# Patient Record
Sex: FEMALE | Race: WHITE
[De-identification: names, ages, dates, MRNs, and addresses within clinical notes are randomized per-mention and may not be internally consistent; named-entity substitution may affect disease eponyms.]

---

## 2017-03-21 ENCOUNTER — HOSPITAL ENCOUNTER (OUTPATIENT)
Dept: HOSPITAL 61 - PCVCIMAG | Age: 82
Discharge: HOME | End: 2017-03-21
Attending: INTERNAL MEDICINE
Payer: MEDICARE

## 2017-03-21 DIAGNOSIS — I10: ICD-10-CM

## 2017-03-21 DIAGNOSIS — I25.10: ICD-10-CM

## 2017-03-21 DIAGNOSIS — I65.23: Primary | ICD-10-CM

## 2017-03-21 DIAGNOSIS — I73.9: ICD-10-CM

## 2017-03-21 PROCEDURE — 93005 ELECTROCARDIOGRAM TRACING: CPT

## 2017-03-21 PROCEDURE — 93306 TTE W/DOPPLER COMPLETE: CPT

## 2017-03-21 PROCEDURE — 93880 EXTRACRANIAL BILAT STUDY: CPT

## 2017-03-21 PROCEDURE — G0463 HOSPITAL OUTPT CLINIC VISIT: HCPCS

## 2017-05-15 ENCOUNTER — HOSPITAL ENCOUNTER (OUTPATIENT)
Dept: HOSPITAL 61 - PCVCIMAG | Age: 82
Discharge: HOME | End: 2017-05-15
Attending: RADIOLOGY
Payer: MEDICARE

## 2017-05-15 DIAGNOSIS — J44.9: ICD-10-CM

## 2017-05-15 DIAGNOSIS — I10: ICD-10-CM

## 2017-05-15 DIAGNOSIS — Z95.820: ICD-10-CM

## 2017-05-15 DIAGNOSIS — I25.10: ICD-10-CM

## 2017-05-15 DIAGNOSIS — I70.213: Primary | ICD-10-CM

## 2017-05-15 DIAGNOSIS — E78.00: ICD-10-CM

## 2017-05-15 PROCEDURE — G0463 HOSPITAL OUTPT CLINIC VISIT: HCPCS

## 2017-05-15 PROCEDURE — 93923 UPR/LXTR ART STDY 3+ LVLS: CPT

## 2017-05-15 PROCEDURE — 93925 LOWER EXTREMITY STUDY: CPT

## 2017-07-28 ENCOUNTER — HOSPITAL ENCOUNTER (OUTPATIENT)
Dept: HOSPITAL 35 - PAIN | Age: 82
Discharge: HOME | End: 2017-07-28
Attending: ANESTHESIOLOGY
Payer: COMMERCIAL

## 2017-07-28 VITALS — DIASTOLIC BLOOD PRESSURE: 70 MMHG | SYSTOLIC BLOOD PRESSURE: 185 MMHG

## 2017-07-28 VITALS — HEIGHT: 62.01 IN | BODY MASS INDEX: 24.82 KG/M2 | WEIGHT: 136.6 LBS

## 2017-07-28 DIAGNOSIS — M54.16: Primary | ICD-10-CM

## 2017-07-28 DIAGNOSIS — M53.3: ICD-10-CM

## 2017-07-28 DIAGNOSIS — G62.9: ICD-10-CM

## 2017-07-28 DIAGNOSIS — M81.0: ICD-10-CM

## 2017-07-28 DIAGNOSIS — Z87.891: ICD-10-CM

## 2017-07-28 DIAGNOSIS — G89.4: ICD-10-CM

## 2017-09-22 ENCOUNTER — HOSPITAL ENCOUNTER (OUTPATIENT)
Dept: HOSPITAL 61 - PCVCCLINIC | Age: 82
Discharge: HOME | End: 2017-09-22
Attending: INTERNAL MEDICINE
Payer: MEDICARE

## 2017-09-22 DIAGNOSIS — Z79.899: ICD-10-CM

## 2017-09-22 DIAGNOSIS — I73.9: ICD-10-CM

## 2017-09-22 DIAGNOSIS — I10: ICD-10-CM

## 2017-09-22 DIAGNOSIS — Z87.891: ICD-10-CM

## 2017-09-22 DIAGNOSIS — Z79.82: ICD-10-CM

## 2017-09-22 DIAGNOSIS — I25.10: Primary | ICD-10-CM

## 2017-09-22 DIAGNOSIS — E78.00: ICD-10-CM

## 2017-09-22 PROCEDURE — 93005 ELECTROCARDIOGRAM TRACING: CPT

## 2017-09-22 PROCEDURE — G0463 HOSPITAL OUTPT CLINIC VISIT: HCPCS

## 2017-11-27 ENCOUNTER — HOSPITAL ENCOUNTER (OUTPATIENT)
Dept: HOSPITAL 61 - PCVCIMAG | Age: 82
Discharge: HOME | End: 2017-11-27
Attending: RADIOLOGY
Payer: MEDICARE

## 2017-11-27 DIAGNOSIS — M79.605: ICD-10-CM

## 2017-11-27 DIAGNOSIS — Z79.82: ICD-10-CM

## 2017-11-27 DIAGNOSIS — Z79.899: ICD-10-CM

## 2017-11-27 DIAGNOSIS — I73.9: Primary | ICD-10-CM

## 2017-11-27 DIAGNOSIS — I10: ICD-10-CM

## 2017-11-27 DIAGNOSIS — I25.10: ICD-10-CM

## 2017-11-27 DIAGNOSIS — E78.00: ICD-10-CM

## 2017-11-27 DIAGNOSIS — I77.9: ICD-10-CM

## 2017-11-27 DIAGNOSIS — Z87.891: ICD-10-CM

## 2017-11-27 DIAGNOSIS — J44.9: ICD-10-CM

## 2017-11-27 DIAGNOSIS — M79.604: ICD-10-CM

## 2017-11-27 PROCEDURE — G0463 HOSPITAL OUTPT CLINIC VISIT: HCPCS

## 2017-11-27 PROCEDURE — 93923 UPR/LXTR ART STDY 3+ LVLS: CPT

## 2017-11-27 PROCEDURE — 93925 LOWER EXTREMITY STUDY: CPT

## 2017-11-27 NOTE — PCVCIMAG
EXAM: NONINVASIVE ARTERIAL EXAMINATION OF BOTH LOWER EXTREMITIES

INCLUDING PRE AND POST EXERCISE PRESSURE MEASUREMENTS AND DOPPLER

WAVEFORMS



INDICATION: Peripheral Arterial Disease. Leg pain.



FINDINGS: 

Right Brachial: 210 mm Hg.

Right Dorsalis Pedis: 209 mm Hg.

Right Posterior Tibial: 164 mm Hg.

Right MOSHE = 1.00.



Left Brachial: 209 mm Hg.

Left Dorsalis Pedis: 168 mm Hg.

Left Posterior Tibial: 173 mm Hg.

Left MOSHE = 0.82.



Post Exercise:

Right Brachial  210 mm Hg.

Right Dorsalis Pedis:  161 mm Hg.

Left Posterior Tibial:  187 mm Hg.

Right MOSHE =  0.77.

Left MOSHE =  0.89.



IMPRESSION: 

No resting ischemia in the right lower extremity.

Mild exercise induced ischemia in the right lower extremity.

Mild resting ischemia in the left lower extremity.

Mild exercise induced ischemia in the left lower extremity.



LOC:[id number]

## 2017-11-27 NOTE — PCVCIMAG
EXAM: BILATERAL LOWER EXTREMITY ARTERIAL DUPLEX



INDICATION: Peripheral Arterial Disease. Leg pain.



FINDINGS: 

Right Leg: Satisfactory waveforms in the common femoral and profunda

femoral arteries. Previous stent upper superficial femoral artery is

patent. No flow-limiting stenosis in the superficial femoral artery.

Mild 40% stenosis mid popliteal artery is not felt to be

flow-limiting. The peroneal artery is occluded. The anterior tibial

and posterior tibial arteries are patent.    



Left Leg:  Satisfactory waveforms in the common femoral and profunda

femoral arteries. Elevated systolic velocity of 380 cm/s the distal

superficial femoral artery within prior stent consistent with 80%

restenosis. The popliteal artery is patent. The anterior tibial,

peroneal, and posterior tibial arteries are patent.    



IMPRESSION: 

Previous upper right superficial femoral artery stent maintaining

satisfactory patency.

40% mid right popliteal artery stenosis is not felt to be

flow-limiting.

Occlusion of the right peroneal artery.

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

stent.

   



LOC:[id number]

## 2017-12-22 ENCOUNTER — HOSPITAL ENCOUNTER (OUTPATIENT)
Dept: HOSPITAL 35 - PAIN | Age: 82
Discharge: HOME | End: 2017-12-22
Attending: ANESTHESIOLOGY
Payer: COMMERCIAL

## 2017-12-22 VITALS — HEIGHT: 62.01 IN | BODY MASS INDEX: 24.53 KG/M2 | WEIGHT: 135 LBS

## 2017-12-22 VITALS — SYSTOLIC BLOOD PRESSURE: 188 MMHG | DIASTOLIC BLOOD PRESSURE: 91 MMHG

## 2017-12-22 DIAGNOSIS — Z79.891: ICD-10-CM

## 2017-12-22 DIAGNOSIS — M47.816: Primary | ICD-10-CM

## 2017-12-22 DIAGNOSIS — Z79.82: ICD-10-CM

## 2017-12-22 DIAGNOSIS — Z98.890: ICD-10-CM

## 2017-12-22 DIAGNOSIS — Z87.891: ICD-10-CM

## 2017-12-22 DIAGNOSIS — M53.3: ICD-10-CM

## 2017-12-22 DIAGNOSIS — G89.29: ICD-10-CM

## 2017-12-22 DIAGNOSIS — Z79.899: ICD-10-CM

## 2018-03-09 ENCOUNTER — HOSPITAL ENCOUNTER (OUTPATIENT)
Dept: HOSPITAL 61 - PCVCIMAG | Age: 83
Discharge: HOME | End: 2018-03-09
Attending: RADIOLOGY
Payer: MEDICARE

## 2018-03-09 DIAGNOSIS — Z87.891: ICD-10-CM

## 2018-03-09 DIAGNOSIS — I48.92: ICD-10-CM

## 2018-03-09 DIAGNOSIS — I10: ICD-10-CM

## 2018-03-09 DIAGNOSIS — E78.00: ICD-10-CM

## 2018-03-09 DIAGNOSIS — I73.9: Primary | ICD-10-CM

## 2018-03-09 DIAGNOSIS — Z79.82: ICD-10-CM

## 2018-03-09 DIAGNOSIS — I42.9: ICD-10-CM

## 2018-03-09 DIAGNOSIS — Z79.899: ICD-10-CM

## 2018-03-09 DIAGNOSIS — I70.8: ICD-10-CM

## 2018-03-09 PROCEDURE — 93925 LOWER EXTREMITY STUDY: CPT

## 2018-03-09 PROCEDURE — 93923 UPR/LXTR ART STDY 3+ LVLS: CPT

## 2018-03-09 PROCEDURE — 93005 ELECTROCARDIOGRAM TRACING: CPT

## 2018-03-26 ENCOUNTER — HOSPITAL ENCOUNTER (OUTPATIENT)
Dept: HOSPITAL 35 - PAIN | Age: 83
End: 2018-03-26
Attending: ANESTHESIOLOGY
Payer: COMMERCIAL

## 2018-03-26 VITALS — SYSTOLIC BLOOD PRESSURE: 125 MMHG | DIASTOLIC BLOOD PRESSURE: 89 MMHG

## 2018-03-26 VITALS — HEIGHT: 62.01 IN | BODY MASS INDEX: 24.71 KG/M2 | WEIGHT: 136 LBS

## 2018-03-26 DIAGNOSIS — M47.897: ICD-10-CM

## 2018-03-26 DIAGNOSIS — M54.16: Primary | ICD-10-CM

## 2018-03-26 DIAGNOSIS — M53.3: ICD-10-CM

## 2018-03-26 DIAGNOSIS — Z79.899: ICD-10-CM

## 2018-04-03 ENCOUNTER — HOSPITAL ENCOUNTER (OUTPATIENT)
Dept: HOSPITAL 61 - PCVCINTER | Age: 83
Discharge: HOME | End: 2018-04-03
Attending: RADIOLOGY
Payer: MEDICARE

## 2018-04-03 DIAGNOSIS — Z87.891: ICD-10-CM

## 2018-04-03 DIAGNOSIS — I70.1: ICD-10-CM

## 2018-04-03 DIAGNOSIS — I70.212: ICD-10-CM

## 2018-04-03 DIAGNOSIS — I70.291: ICD-10-CM

## 2018-04-03 DIAGNOSIS — I70.0: ICD-10-CM

## 2018-04-03 DIAGNOSIS — I10: ICD-10-CM

## 2018-04-03 DIAGNOSIS — I25.10: Primary | ICD-10-CM

## 2018-04-03 PROCEDURE — 99153 MOD SED SAME PHYS/QHP EA: CPT

## 2018-04-03 PROCEDURE — 99152 MOD SED SAME PHYS/QHP 5/>YRS: CPT

## 2018-04-03 PROCEDURE — 75716 ARTERY X-RAYS ARMS/LEGS: CPT

## 2018-04-03 PROCEDURE — 76937 US GUIDE VASCULAR ACCESS: CPT

## 2018-04-03 PROCEDURE — 37229: CPT

## 2018-04-03 PROCEDURE — 93458 L HRT ARTERY/VENTRICLE ANGIO: CPT

## 2018-04-03 PROCEDURE — 37225: CPT

## 2018-04-03 PROCEDURE — 36252 INS CATH REN ART 1ST BILAT: CPT

## 2018-04-03 PROCEDURE — 37186 SEC ART THROMBECTOMY ADD-ON: CPT

## 2018-04-04 ENCOUNTER — HOSPITAL ENCOUNTER (OUTPATIENT)
Dept: HOSPITAL 35 - CATH | Age: 83
LOS: 1 days | Discharge: HOME | End: 2018-04-05
Attending: INTERNAL MEDICINE
Payer: COMMERCIAL

## 2018-04-04 VITALS — SYSTOLIC BLOOD PRESSURE: 146 MMHG | DIASTOLIC BLOOD PRESSURE: 60 MMHG

## 2018-04-04 VITALS — HEIGHT: 60 IN | BODY MASS INDEX: 27.11 KG/M2 | WEIGHT: 138.1 LBS

## 2018-04-04 VITALS — SYSTOLIC BLOOD PRESSURE: 113 MMHG | DIASTOLIC BLOOD PRESSURE: 50 MMHG

## 2018-04-04 DIAGNOSIS — I73.89: ICD-10-CM

## 2018-04-04 DIAGNOSIS — Z79.01: ICD-10-CM

## 2018-04-04 DIAGNOSIS — J44.9: ICD-10-CM

## 2018-04-04 DIAGNOSIS — I10: ICD-10-CM

## 2018-04-04 DIAGNOSIS — E78.00: ICD-10-CM

## 2018-04-04 DIAGNOSIS — Z79.899: ICD-10-CM

## 2018-04-04 DIAGNOSIS — Z79.82: ICD-10-CM

## 2018-04-04 DIAGNOSIS — I25.110: Primary | ICD-10-CM

## 2018-04-04 DIAGNOSIS — G89.4: ICD-10-CM

## 2018-04-04 DIAGNOSIS — Z98.890: ICD-10-CM

## 2018-04-04 DIAGNOSIS — Z87.19: ICD-10-CM

## 2018-04-04 PROCEDURE — 10797: CPT

## 2018-04-05 VITALS — SYSTOLIC BLOOD PRESSURE: 160 MMHG | DIASTOLIC BLOOD PRESSURE: 83 MMHG

## 2018-04-05 VITALS — DIASTOLIC BLOOD PRESSURE: 53 MMHG | SYSTOLIC BLOOD PRESSURE: 121 MMHG

## 2018-04-05 VITALS — DIASTOLIC BLOOD PRESSURE: 72 MMHG | SYSTOLIC BLOOD PRESSURE: 156 MMHG

## 2018-04-05 VITALS — DIASTOLIC BLOOD PRESSURE: 83 MMHG | SYSTOLIC BLOOD PRESSURE: 160 MMHG

## 2018-04-05 LAB
ALBUMIN SERPL-MCNC: 3.4 G/DL (ref 3.4–5)
ALT SERPL-CCNC: 18 U/L (ref 30–65)
ANION GAP SERPL CALC-SCNC: 7 MMOL/L (ref 7–16)
AST SERPL-CCNC: 24 U/L (ref 15–37)
BILIRUB SERPL-MCNC: 0.4 MG/DL
BUN SERPL-MCNC: 14 MG/DL (ref 7–18)
CALCIUM SERPL-MCNC: 9.3 MG/DL (ref 8.5–10.1)
CHLORIDE SERPL-SCNC: 104 MMOL/L (ref 98–107)
CO2 SERPL-SCNC: 27 MMOL/L (ref 21–32)
CREAT SERPL-MCNC: 0.8 MG/DL (ref 0.6–1)
ERYTHROCYTE [DISTWIDTH] IN BLOOD BY AUTOMATED COUNT: 15 % (ref 10.5–14.5)
GLUCOSE SERPL-MCNC: 106 MG/DL (ref 74–106)
HCT VFR BLD CALC: 37.9 % (ref 37–47)
HGB BLD-MCNC: 12.5 GM/DL (ref 12–15)
MCH RBC QN AUTO: 29.5 PG (ref 26–34)
MCHC RBC AUTO-ENTMCNC: 33 G/DL (ref 28–37)
MCV RBC: 89.6 FL (ref 80–100)
PLATELET # BLD: 239 THOU/UL (ref 150–400)
POTASSIUM SERPL-SCNC: 3.9 MMOL/L (ref 3.5–5.1)
PROT SERPL-MCNC: 6.9 G/DL (ref 6.4–8.2)
RBC # BLD AUTO: 4.23 MIL/UL (ref 4.2–5)
SODIUM SERPL-SCNC: 138 MMOL/L (ref 136–145)
TROPONIN I SERPL-MCNC: 0.52 NG/ML (ref ?–0.06)
WBC # BLD AUTO: 6.7 THOU/UL (ref 4–11)

## 2018-04-20 ENCOUNTER — HOSPITAL ENCOUNTER (OUTPATIENT)
Dept: HOSPITAL 61 - PCVCCLINIC | Age: 83
Discharge: HOME | End: 2018-04-20
Attending: INTERNAL MEDICINE
Payer: MEDICARE

## 2018-04-20 DIAGNOSIS — Z87.891: ICD-10-CM

## 2018-04-20 DIAGNOSIS — I25.10: ICD-10-CM

## 2018-04-20 DIAGNOSIS — I10: Primary | ICD-10-CM

## 2018-04-20 DIAGNOSIS — I73.9: ICD-10-CM

## 2018-04-20 DIAGNOSIS — Z79.82: ICD-10-CM

## 2018-04-20 DIAGNOSIS — E78.00: ICD-10-CM

## 2018-04-20 DIAGNOSIS — Z79.899: ICD-10-CM

## 2018-04-20 DIAGNOSIS — R94.31: ICD-10-CM

## 2018-04-20 PROCEDURE — 36415 COLL VENOUS BLD VENIPUNCTURE: CPT

## 2018-04-20 PROCEDURE — 93005 ELECTROCARDIOGRAM TRACING: CPT

## 2018-06-21 ENCOUNTER — HOSPITAL ENCOUNTER (OUTPATIENT)
Dept: HOSPITAL 61 - PCVCCLINIC | Age: 83
Discharge: HOME | End: 2018-06-21
Attending: INTERNAL MEDICINE
Payer: MEDICARE

## 2018-06-21 ENCOUNTER — HOSPITAL ENCOUNTER (OUTPATIENT)
Dept: HOSPITAL 35 - PAIN | Age: 83
End: 2018-06-21
Attending: ANESTHESIOLOGY
Payer: COMMERCIAL

## 2018-06-21 VITALS — DIASTOLIC BLOOD PRESSURE: 56 MMHG | SYSTOLIC BLOOD PRESSURE: 154 MMHG

## 2018-06-21 VITALS — HEIGHT: 62.01 IN | WEIGHT: 130.2 LBS | BODY MASS INDEX: 23.66 KG/M2

## 2018-06-21 DIAGNOSIS — E78.00: ICD-10-CM

## 2018-06-21 DIAGNOSIS — M47.816: Primary | ICD-10-CM

## 2018-06-21 DIAGNOSIS — M47.817: ICD-10-CM

## 2018-06-21 DIAGNOSIS — I25.10: Primary | ICD-10-CM

## 2018-06-21 DIAGNOSIS — I10: ICD-10-CM

## 2018-06-21 DIAGNOSIS — M41.85: ICD-10-CM

## 2018-06-21 DIAGNOSIS — I73.9: ICD-10-CM

## 2018-06-21 DIAGNOSIS — Z79.899: ICD-10-CM

## 2018-06-21 PROCEDURE — 36415 COLL VENOUS BLD VENIPUNCTURE: CPT

## 2018-08-21 ENCOUNTER — HOSPITAL ENCOUNTER (OUTPATIENT)
Dept: HOSPITAL 61 - PCVCIMAG | Age: 83
Discharge: HOME | End: 2018-08-21
Attending: RADIOLOGY
Payer: MEDICARE

## 2018-08-21 DIAGNOSIS — I25.10: ICD-10-CM

## 2018-08-21 DIAGNOSIS — E78.00: ICD-10-CM

## 2018-08-21 DIAGNOSIS — Z87.891: ICD-10-CM

## 2018-08-21 DIAGNOSIS — Z79.82: ICD-10-CM

## 2018-08-21 DIAGNOSIS — I73.9: Primary | ICD-10-CM

## 2018-08-21 DIAGNOSIS — I10: ICD-10-CM

## 2018-08-21 DIAGNOSIS — I77.9: ICD-10-CM

## 2018-08-21 PROCEDURE — 93925 LOWER EXTREMITY STUDY: CPT

## 2018-08-21 PROCEDURE — G0463 HOSPITAL OUTPT CLINIC VISIT: HCPCS

## 2018-08-21 NOTE — PCVCIMAG
EXAM: BILATERAL LOWER EXTREMITY ARTERIAL DUPLEX



INDICATION: Peripheral Arterial Disease. Leg pain.



FINDINGS: 

Right Leg: Common femoral and profunda femoral arteries are patent. 

Superficial femoral artery and popliteal artery maintaining adequate

patency.  Previous stent upper superficial femoral artery is patent. 

Unchanged occlusion of the right peroneal and right posterior tibial

arteries.  Anterior tibial artery is patent.    



Left Leg:  Common femoral and profunda femoral arteries are patent. 

Superficial femoral and popliteal arteries are patent.  Previous stent

throughout the superficial femoral artery is patent.  Anterior tibial

artery shows satisfactory patency as is the posterior tibial artery. 

Mid and distal peroneal artery is occluded.    



IMPRESSION: 

Previous upper right superficial femoral artery stent is patent.

Unchanged occlusion right peroneal and right posterior tibial

arteries.

Previous left superficial femoral artery stent is patent.

Unchanged occlusion of the mid/distal left peroneal artery.



LOC:[id number]

## 2018-11-27 ENCOUNTER — HOSPITAL ENCOUNTER (OUTPATIENT)
Dept: HOSPITAL 35 - PAIN | Age: 83
End: 2018-11-27
Attending: CLINICAL NURSE SPECIALIST
Payer: COMMERCIAL

## 2018-11-27 VITALS — HEIGHT: 62.01 IN | BODY MASS INDEX: 24.17 KG/M2 | WEIGHT: 133 LBS

## 2018-11-27 VITALS — SYSTOLIC BLOOD PRESSURE: 154 MMHG | DIASTOLIC BLOOD PRESSURE: 69 MMHG

## 2018-11-27 DIAGNOSIS — M48.061: ICD-10-CM

## 2018-11-27 DIAGNOSIS — M47.27: Primary | ICD-10-CM

## 2018-11-27 DIAGNOSIS — M51.16: ICD-10-CM

## 2018-11-27 DIAGNOSIS — Z79.899: ICD-10-CM

## 2018-11-27 DIAGNOSIS — G89.4: ICD-10-CM

## 2018-12-18 ENCOUNTER — HOSPITAL ENCOUNTER (OUTPATIENT)
Dept: HOSPITAL 61 - PCVCIMAG | Age: 83
Discharge: HOME | End: 2018-12-18
Attending: INTERNAL MEDICINE
Payer: MEDICARE

## 2018-12-18 DIAGNOSIS — J44.9: ICD-10-CM

## 2018-12-18 DIAGNOSIS — I08.1: Primary | ICD-10-CM

## 2018-12-18 DIAGNOSIS — R60.0: ICD-10-CM

## 2018-12-18 DIAGNOSIS — Z79.82: ICD-10-CM

## 2018-12-18 DIAGNOSIS — I25.10: ICD-10-CM

## 2018-12-18 DIAGNOSIS — K21.9: ICD-10-CM

## 2018-12-18 DIAGNOSIS — Z87.891: ICD-10-CM

## 2018-12-18 DIAGNOSIS — E78.00: ICD-10-CM

## 2018-12-18 DIAGNOSIS — I73.9: ICD-10-CM

## 2018-12-18 DIAGNOSIS — I10: ICD-10-CM

## 2018-12-18 DIAGNOSIS — E78.5: ICD-10-CM

## 2018-12-18 PROCEDURE — 93306 TTE W/DOPPLER COMPLETE: CPT

## 2018-12-18 PROCEDURE — G0463 HOSPITAL OUTPT CLINIC VISIT: HCPCS

## 2018-12-18 PROCEDURE — 93005 ELECTROCARDIOGRAM TRACING: CPT

## 2018-12-18 NOTE — PCVCIMAG
--------------- APPROVED REPORT --------------





Study performed:  12/18/2018 13:13:33



EXAM: Comprehensive 2D, Doppler, and color-flow 

Echocardiogram

Patient Location: Echo lab

Status:  routine



BSA:         1.59

HR: 80 bpmBP:          180/80 mmHg

Rhythm: NSR



Other Information 

Study Quality: Good



Risk Factors: 

Cardiac Risk Factors:  

HTN



Indications

CAD

Hypertension/HDD

Hyperlipidemia, COPD, PAD



2D Dimensions

IVSd:  10.28 (7-11mm)LVOT Diam:  21.17 (18-24mm) 

LVDd:  42.17 mm

PWd:  10.15 (7-11mm)Ascending Ao:  30.21 (22-36mm)

LVDs:  22.80 (25-40mm)

Left Atrium:  43.22 (27-40mm)

Aortic Root:  31.71 mm

LV Single Plane 4CH:  53.35 %

LV Single Plane 2CH:  43.92 %

Biplane EF:  45.2 %



Volumes

Left Atrial Volume (Systole)

Single Plane 4CH:  41.94 mLSingle Plane 2CH:  39.00 mL

LA ESV Index:  26.00 mL/m2



Aortic Valve

AoV Peak Erik.:  1.20 m/s

AO Peak Gr.:  5.78 mmHgLVOT Max PG:  3.19 mmHg

LVOT Max V:  0.89 m/s

WILLIAM Vmax: 2.61 cm2



Mitral Valve

E/A Ratio:  1.1

MV Decel. Time:  122.75 ms

MV E Max Erik.:  1.17 m/s

MV A Erik.:  1.05 m/s



TDI

E/Lateral E':  10.64E/Medial E':  14.63

Medial E' Erik.:  0.08 m/s

Lateral E' Erik.:  0.11 m/s



Pulmonary Valve

PV Peak Erik.:  11.00 m/sPV Peak Gr.:  2.60 mmHg



Pulmonary Vein

P Vein S:    0.43 m/sP Vein A:  0.36 m/s

P Vein D:   0.81 m/sP Vein A Dur.:  96.9 msec

P Vein S/D Ratio:  0.53



Tricuspid Valve

TR Peak Erik.:  4.15 m/s

TR Peak Gr.:  68.91 mmHg



Left Ventricle

The left ventricle is normal size. There is normal LV segmental wall 

motion. There is normal left ventricular wall thickness. Left 

ventricular systolic function is normal. The left ventricular 

ejection fraction is within the normal range. LVEF is 55%. The left 

ventricular diastolic function is normal.



Right Ventricle

The right ventricle is normal size. The right ventricular systolic 

function is normal.



Atria

The left atrium size is normal. The right atrium size is 

normal.



Aortic Valve

The aortic valve is normal in structure. No aortic regurgitation is 

present. There is no aortic valvular stenosis.



Mitral Valve

The mitral valve is normal in structure. Moderate mitral 

regurgitation. No evidence of mitral valve stenosis.



Tricuspid Valve

The tricuspid valve is normal in structure. Mild tricuspid 

regurgitation. Pulmonary artery pressure is 76mmHg. Moderately severe 

pulmonary hypertension.



Pulmonic Valve

The pulmonary valve is normal in structure. Mild pulmonic 

regurgitation.



Great Vessels

The aortic root is normal in size. IVC is normal in size and 

collapses &gt;50% with inspiration.



Pericardium

There is no pericardial effusion.



&lt;Conclusion&gt;

The left ventricle is normal size.

There is normal left ventricular wall thickness.

Left ventricular systolic function is normal.

The right ventricle is normal size.

The left atrium size is normal.

The aortic valve is normal in structure.

Moderate mitral regurgitation.

Mild tricuspid regurgitation. Pulmonary artery pressure is 

76mmHg.

Moderately severe pulmonary hypertension.

## 2019-02-19 ENCOUNTER — HOSPITAL ENCOUNTER (OUTPATIENT)
Dept: HOSPITAL 61 - PCVCIMAG | Age: 84
Discharge: HOME | End: 2019-02-19
Attending: RADIOLOGY
Payer: MEDICARE

## 2019-02-19 DIAGNOSIS — I65.23: Primary | ICD-10-CM

## 2019-02-19 DIAGNOSIS — I10: ICD-10-CM

## 2019-02-19 DIAGNOSIS — E78.00: ICD-10-CM

## 2019-02-19 DIAGNOSIS — Z79.899: ICD-10-CM

## 2019-02-19 DIAGNOSIS — I73.9: ICD-10-CM

## 2019-02-19 DIAGNOSIS — I77.9: ICD-10-CM

## 2019-02-19 DIAGNOSIS — Z87.891: ICD-10-CM

## 2019-02-19 DIAGNOSIS — J44.9: ICD-10-CM

## 2019-02-19 DIAGNOSIS — I25.10: ICD-10-CM

## 2019-02-19 PROCEDURE — G0463 HOSPITAL OUTPT CLINIC VISIT: HCPCS

## 2019-02-19 PROCEDURE — 93925 LOWER EXTREMITY STUDY: CPT

## 2019-02-19 PROCEDURE — 93880 EXTRACRANIAL BILAT STUDY: CPT

## 2019-02-19 NOTE — PCVCIMAG
EXAM: BILATERAL LOWER EXTREMITY ARTERIAL DUPLEX



INDICATION: Peripheral Arterial Disease. Leg pain.



FINDINGS: 

Right Leg: Common femoral profunda femoral arteries are patent.

Superficial femoral artery and popliteal artery are patent. Previous

stent proximal superficial femoral artery is patent. Occlusion

throughout the peroneal artery and posterior tibial artery are

unchanged. Anterior tibial artery maintaining adequate patency.    



Left Leg:  Common femoral and profunda femoral arteries are patent.

Superficial femoral artery and popliteal artery are patent including

previous superficial femoral artery and popliteal artery stents. The

anterior tibial, peroneal, and posterior tibial arteries are patent.  

 



IMPRESSION: 

Previous proximal right superficial femoral artery stent remains

patent.

Unchanged occlusion of the right peroneal and posterior tibial

arteries.

Previous left superficial femoral artery and upper popliteal artery

stents maintaining satisfactory patency.

   



LOC:[id number]

## 2019-02-19 NOTE — PCVCIMAG
--------------- APPROVED REPORT --------------





Laterality: Bilateral



Patient Location: Out-Patient



Indications

Stenosis



Doppler Spectral Velocity Analysis

PSV  /  EDVPSV  /  EDV

ECA (R)     102 / 3 cm/sECA (L)     286 / 18 cm/s



dICA (R)    62 / 17 cm/sdICA (L)     71 / 12 cm/s

Kathrine (R)     91 / 15 cm/smICA (L)     82 / 18 cm/s

pICA (R)     89 / 14 cm/spICA (L)     85 / 15 cm/s



Bulb (R)        41 / 9 cm/sBulb (L)         68 / 9 cm/s



dCCA (R)     72 / 10 cm/sdCCA (L)     83 / 12 cm/s

mCCA (R)    70 / 9 cm/smCCA (L)    69 / 11 cm/s



Vert (R)     52 / 8 cm/sVert (L)     35 / 9 cm/s

ICA/CCA     1.26ICA/CCA     1.02



Findings

The right carotid bulb has moderate calcified plaque.

The right proximal internal carotid artery shows <40% 

stenosis.

The right common carotid artery shows no significant stenosis.

The right external carotid artery shows no significant stenosis.

The left carotid bulb has moderate calcified plaque.

The left proximal internal carotid artery shows <40% stenosis.

The left common carotid artery shows <40% stenosis.

The left external carotid artery shows >90% 

stenosis.



Conclusion

1.  Right internal carotid artery stenosis (<40%)

2.  Left common and internal carotid artery stenosis (<40%)

3.  Antegrade vertebral flow

Similar to March, 2017

## 2019-03-22 ENCOUNTER — HOSPITAL ENCOUNTER (OUTPATIENT)
Dept: HOSPITAL 35 - RAD | Age: 84
End: 2019-03-22
Attending: CLINICAL NURSE SPECIALIST
Payer: COMMERCIAL

## 2019-03-22 VITALS — HEIGHT: 62.01 IN | BODY MASS INDEX: 25.04 KG/M2 | WEIGHT: 137.8 LBS

## 2019-03-22 VITALS — DIASTOLIC BLOOD PRESSURE: 79 MMHG | SYSTOLIC BLOOD PRESSURE: 141 MMHG

## 2019-03-22 DIAGNOSIS — Z79.891: ICD-10-CM

## 2019-03-22 DIAGNOSIS — M51.17: ICD-10-CM

## 2019-03-22 DIAGNOSIS — M47.27: Primary | ICD-10-CM

## 2019-03-22 DIAGNOSIS — M48.061: ICD-10-CM

## 2019-03-22 DIAGNOSIS — G89.4: ICD-10-CM

## 2019-03-22 DIAGNOSIS — M16.0: ICD-10-CM

## 2019-03-22 DIAGNOSIS — Z79.899: ICD-10-CM

## 2019-03-22 NOTE — NUR
Pain Clinic Assessment:
 
1. History of Osteoarthritis:
FINGERS
   History of Rheumatoid Arthritis:
Not Applicable
 
2. Height: 5 ft. 2 in. 157.5 cm.
   Weight: 137.8 lb.  oz. 62.506 kg.
   Patient's BMI: 25.2
 
3. Vital Signs:
   BP: 141/79 Pulse: 62 Resp: 16
   Temp:  02 Sat: 94 ECG Mon:
 
4. Pain Intensity: 10
 
5. Fall Risk:
   Dizziness: N  Needs help standing or walking: N
   Fallen in the last 3 months: Y
   Fall risk comments:
 
 
6. Patient on Blood Thinner: Clopidogrel Bisulf(Plavix
 
7. History of Hypertension: Y
 
8. Opioid Therapy greater than 6 weeks: Y
   Opiate Contract Signed: 09/02/16
 
9. Risk Assessment Tool Provided: 1-LOW RISK
 
10. Functional Assessment Tool: 45/70
 
11. Recreational Drug Use: Never Drug Type:
    Tobacco Use: Never Smoker Tobacco Type:
       Amount or Packs/day:  How Many Years:
    Alcohol Use: Yes  Frequency: Daily Quant: 1-2

## 2019-04-03 ENCOUNTER — HOSPITAL ENCOUNTER (OUTPATIENT)
Dept: HOSPITAL 35 - PAIN | Age: 84
Discharge: HOME | End: 2019-04-03
Attending: ANESTHESIOLOGY
Payer: COMMERCIAL

## 2019-04-03 VITALS — WEIGHT: 139.6 LBS | HEIGHT: 62.01 IN | BODY MASS INDEX: 25.36 KG/M2

## 2019-04-03 VITALS — DIASTOLIC BLOOD PRESSURE: 75 MMHG | SYSTOLIC BLOOD PRESSURE: 183 MMHG

## 2019-04-03 DIAGNOSIS — M51.16: Primary | ICD-10-CM

## 2019-04-03 DIAGNOSIS — G89.29: ICD-10-CM

## 2019-04-03 NOTE — NUR
Pain Clinic Assessment:
 
1. History of Osteoarthritis:
FINGERS
   History of Rheumatoid Arthritis:
Not Applicable
 
2. Height: 5 ft. 2 in. 157.5 cm.
   Weight: 139.6 lb.  oz. 63.322 kg.
   Patient's BMI: 25.5
 
3. Vital Signs:
   BP: 183/75 Pulse: 62 Resp: 16
   Temp:  02 Sat: 96 ECG Mon:
 
4. Pain Intensity: 10
 
5. Fall Risk:
   Dizziness: N  Needs help standing or walking: N
   Fallen in the last 3 months: Y
   Fall risk comments:
 
 
6. Patient on Blood Thinner: Clopidogrel Bisulf(Plavix
 
7. History of Hypertension: Y
 
8. Opioid Therapy greater than 6 weeks: Y
   Opiate Contract Signed: 09/02/16
 
9. Risk Assessment Tool Provided: 1-LOW RISK
 
10. Functional Assessment Tool: 45/70
 
11. Recreational Drug Use: Never Drug Type:
    Tobacco Use: Never Smoker Tobacco Type:
       Amount or Packs/day:  How Many Years:
    Alcohol Use: Yes  Frequency: Daily Quant: 1-2

## 2019-06-19 ENCOUNTER — HOSPITAL ENCOUNTER (OUTPATIENT)
Dept: HOSPITAL 61 - PCVCIMAG | Age: 84
Discharge: HOME | End: 2019-06-19
Attending: INTERNAL MEDICINE
Payer: MEDICARE

## 2019-06-19 DIAGNOSIS — I63.9: ICD-10-CM

## 2019-06-19 DIAGNOSIS — I25.9: ICD-10-CM

## 2019-06-19 DIAGNOSIS — Z79.899: ICD-10-CM

## 2019-06-19 DIAGNOSIS — J44.9: ICD-10-CM

## 2019-06-19 DIAGNOSIS — E78.5: ICD-10-CM

## 2019-06-19 DIAGNOSIS — I25.10: Primary | ICD-10-CM

## 2019-06-19 DIAGNOSIS — E78.00: ICD-10-CM

## 2019-06-19 DIAGNOSIS — I10: ICD-10-CM

## 2019-06-19 DIAGNOSIS — Z79.82: ICD-10-CM

## 2019-06-19 DIAGNOSIS — I73.9: ICD-10-CM

## 2019-06-19 DIAGNOSIS — Z72.89: ICD-10-CM

## 2019-06-19 DIAGNOSIS — Z87.891: ICD-10-CM

## 2019-06-19 PROCEDURE — A9500 TC99M SESTAMIBI: HCPCS

## 2019-06-19 PROCEDURE — 93017 CV STRESS TEST TRACING ONLY: CPT

## 2019-06-19 PROCEDURE — G0463 HOSPITAL OUTPT CLINIC VISIT: HCPCS

## 2019-06-19 PROCEDURE — 78452 HT MUSCLE IMAGE SPECT MULT: CPT

## 2019-06-19 NOTE — PCVCIMAG
--------------- APPROVED REPORT --------------





Imaging Protocol: Rest Tc-99m/Stress Tc-99m 1 day

Study performed:  06/19/2019 10:31:44



Indication: CAD

Patient Location: Out-Patient

Stress Nurse: Maria E Barth RN, Gricel Corona RN

NM Tech:Yasmine Ava [facility]



Ht: 5 ft 2 in Wt: 130 lbs BSA:  1.59 m2

HR: 56 bpm                      BP: 200/97 mmHg         BMI:  

23.77

Rhythm:  Sinus Bradycardia, nonspecific ST-T 

abnormalities



Medical History

Medical History: Hyperlipidemia, HTN, CVD, PVD, COPD, Former 

Smoker

Medications: ASA, Plavix, Livalo (other non cardiac meds)

Allergies: No known drug allergies

Cardiac Risk Factors: Age

Pretest Chest Pain Characteristics: No chest pain

Exercise History: Sedentary



Resting Data

Rest SPECT myocardial perfusion imaging was performed in supine 

position 45 minutes following the intravenous injection of 10.7 mCi 

of Tc-99m Sestamibi.

Time of rest injection: 1010     Date: 06/19/2019

Administration Route: IV

Administration Site: Right Wrist



Pharmacologic Stress

Pharmacologic stress test was performed by injecting Regadenoson 0.4 

mg IV push over 10-15 seconds immediately followed by the intravenous 

injection of 34.9 mCi of Tc-99m Sestamibi.

Time of stress injection: 1130     Date: 06/19/2019

Administration Route: IV

Administration Site: Right Wrist

Gated Stress SPECT was performed 45 minutes after stress 

injection.

The images were gated to evaluate regional wall motion and calculate 

left ventricular ejection fraction. 



Stress Test Details

Stress Test:  Pharmacologic stress testing performed using 0.4 mg of 

regadenoson per 5 mL given IV over 10 seconds.

  Reason for pharmacologic stress test: physical limitation, uses a 

cane.

  Reversal agent Aminophyline 100 mg, given intravenously for 

nausea.



HRMax Heart Rate (APMHR): 137 bpm 

Resting HR:            56 bpmTarget HR (85% APMHR): 116 bpm

Max HR Achieved:  68 bpm

% of APMHR:         49

Recovery HR:            60 bpm



BP

Resting BP:  200/79 mmHg

Max BP:       231/95 mmHg

Recovery BP:       175/74 mmHg

ECG

Resting ECG:  Sinus Bradycardia, nonspecific ST-T 

abnormalities

Stress ECG:     Sinus Rhythm, nonspecific ST-T abnormalities

ST Change: Nondiagnostic resting ST abnormalities

Arrhythmia:    None

Recovery ECG: Sinus Rhythm, nonspecific ST-T 

abnormalities



Clinical

Reason for Termination: Completed protocol

Stress Symptoms: Abdominal discomfort, Dyspnea, Lightheaded, 

Nausea

Symptoms resolved during recovery with aminophylline.



Study Quality

Study: Good



Study Data

Post stress, the left ventricular ejection was 75%..

SSS: 3

SRS: 0

SDS: 3

TID = 1.19.



Perfusion

There is a small area of mildly reduced uptake in the apical segment 

of the anterior wall which is seen on the stress images and 

normalizes on the resting images. This area thickens and moves 

normally and is most consistent with ischemia.



Wall Motion

Normal left ventricular wall motion.



Nuclear Conclusion

ECG Findings: non-diagnostic 

Clinical Findings: non-diagnostic 

Nuclear Findings: positive for ischemia 

Exercise Capacity: not assessed

Left Ventricular Function: normal 

There is a small area of apical ischemia.

There is normal global and segmental LV systolic function.

## 2019-07-16 ENCOUNTER — HOSPITAL ENCOUNTER (OUTPATIENT)
Dept: HOSPITAL 35 - PAIN | Age: 84
End: 2019-07-16
Attending: CLINICAL NURSE SPECIALIST
Payer: COMMERCIAL

## 2019-07-16 VITALS — SYSTOLIC BLOOD PRESSURE: 158 MMHG | DIASTOLIC BLOOD PRESSURE: 63 MMHG

## 2019-07-16 VITALS — HEIGHT: 62.01 IN | BODY MASS INDEX: 24.64 KG/M2 | WEIGHT: 135.6 LBS

## 2019-07-16 DIAGNOSIS — M19.90: ICD-10-CM

## 2019-07-16 DIAGNOSIS — M48.00: ICD-10-CM

## 2019-07-16 DIAGNOSIS — M47.27: Primary | ICD-10-CM

## 2019-07-16 DIAGNOSIS — G89.29: ICD-10-CM

## 2019-07-16 DIAGNOSIS — M47.26: ICD-10-CM

## 2019-07-16 NOTE — NUR
Pain Clinic Assessment:
 
1. History of Osteoarthritis:
FINGERS
   History of Rheumatoid Arthritis:
Not Applicable
 
2. Height: 5 ft. 2 in. 157.5 cm.
   Weight: 135.6 lb.  oz. 61.508 kg.
   Patient's BMI: 24.8
 
3. Vital Signs:
   BP: 158/63 Pulse: 60 Resp: 20
   Temp:  02 Sat: 98 ECG Mon:
 
4. Pain Intensity: 8
 
5. Fall Risk:
   Dizziness: Y  Needs help standing or walking: Y
   Fallen in the last 3 months: Y
   Fall risk comments:
 
 
6. Patient on Blood Thinner: Clopidogrel Bisulf(Plavix
 
7. History of Hypertension: Y
 
8. Opioid Therapy greater than 6 weeks: Y
   Opiate Contract Signed: 09/02/16
 
9. Risk Assessment Tool Provided: 1-LOW RISK
 
10. Functional Assessment Tool: 45/70
 
11. Recreational Drug Use: Never Drug Type:
    Tobacco Use: Never Smoker Tobacco Type:
       Amount or Packs/day:  How Many Years:
    Alcohol Use: Yes  Frequency:  Quant:

## 2019-09-26 ENCOUNTER — HOSPITAL ENCOUNTER (OUTPATIENT)
Dept: HOSPITAL 61 - PCVCIMAG | Age: 84
Discharge: HOME | End: 2019-09-26
Attending: RADIOLOGY
Payer: MEDICARE

## 2019-09-26 DIAGNOSIS — E78.00: ICD-10-CM

## 2019-09-26 DIAGNOSIS — Z87.891: ICD-10-CM

## 2019-09-26 DIAGNOSIS — Z79.899: ICD-10-CM

## 2019-09-26 DIAGNOSIS — Z79.82: ICD-10-CM

## 2019-09-26 DIAGNOSIS — I73.9: Primary | ICD-10-CM

## 2019-09-26 DIAGNOSIS — I10: ICD-10-CM

## 2019-09-26 DIAGNOSIS — I77.9: ICD-10-CM

## 2019-09-26 DIAGNOSIS — Z90.49: ICD-10-CM

## 2019-09-26 DIAGNOSIS — I25.10: ICD-10-CM

## 2019-09-26 DIAGNOSIS — Z72.89: ICD-10-CM

## 2019-09-26 DIAGNOSIS — J44.9: ICD-10-CM

## 2019-09-26 PROCEDURE — 93925 LOWER EXTREMITY STUDY: CPT

## 2019-09-26 PROCEDURE — G0463 HOSPITAL OUTPT CLINIC VISIT: HCPCS

## 2019-09-26 NOTE — PCVCIMAG
EXAM: BILATERAL LOWER EXTREMITY ARTERIAL DUPLEX



INDICATION: Peripheral Arterial Disease. Leg pain.



FINDINGS: 

Right Leg: Common femoral and profunda femoral arteries are patent.

Superficial femoral artery and popliteal artery are patent. Previous

stent upper superficial femoral artery is patent. The peroneal and

posterior tibial arteries are patent. The anterior tibial artery is

patent.



Left Leg:  Satisfactory arterial waveforms throughout the

common/profunda/superficial femoral, popliteal, anterior tibial,

peroneal, and posterior tibial arteries. No flow limiting stenosis

seen. Previous superficial femoral artery stent maintaining good

patency.



IMPRESSION: 

Previous proximal right superficial femoral artery stent maintaining

good patency.

Unchanged occlusion of the right peroneal and posterior tibial

arteries.

Previous left superficial femoral artery and upper popliteal artery

stents maintaining satisfactory patency.





LOC:[id number]

## 2019-11-06 ENCOUNTER — HOSPITAL ENCOUNTER (OUTPATIENT)
Dept: HOSPITAL 35 - PAIN | Age: 84
End: 2019-11-06
Attending: CLINICAL NURSE SPECIALIST
Payer: COMMERCIAL

## 2019-11-06 VITALS — WEIGHT: 133 LBS | BODY MASS INDEX: 24.17 KG/M2 | HEIGHT: 62.01 IN

## 2019-11-06 VITALS — SYSTOLIC BLOOD PRESSURE: 153 MMHG | DIASTOLIC BLOOD PRESSURE: 70 MMHG

## 2019-11-06 DIAGNOSIS — M19.90: ICD-10-CM

## 2019-11-06 DIAGNOSIS — G89.4: ICD-10-CM

## 2019-11-06 DIAGNOSIS — M47.27: Primary | ICD-10-CM

## 2019-11-06 DIAGNOSIS — M51.16: ICD-10-CM
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
## 2019-11-07 NOTE — HPC
[facility]
[address]
[place]   [address]                     PAIN MANAGEMENT CONSULTATION  
_______________________________________________________________________________
 
Name:       LILLIANASTASIACRISTELAMILCAR CASSIDY              Room #:                     REG [facility]#:      [id number]                       Account #:      [id number]  
Admission:  11/06/19    Attend Phys:    Mary Petit     
Discharge:              Date of Birth:  [birth year]  
                                                          Report #: [id number]
                                                                    [id number]   
_______________________________________________________________________________
THIS REPORT FOR:   //name//                          
 
CC: Mary Petit
    Lamar Villanuevarocael
 
DATE OF SERVICE:  11/06/2019
 
 
CHIEF COMPLAINT:  Low back pain, right lower extremity pain and left arm pain.
 
HISTORY OF PRESENT ILLNESS:  This is a pleasant 84-year-old female who returns
to the pain clinic today for refill of her medications that she uses to help
treat her ongoing low back pain and leg pain.  Today she is also complaining of
left arm pain.  She reports this pain comes and goes over the past year, has
been intermittent over the past years, but has been constant numbness and
tingling in her left arm for about 3 weeks.  She does not complain of any neck
pain.  Today she reports her pain as an 8/10.  It is worse when she is walking,
standing, movement of her arms as well.  She feels like sitting and lying down
and using her medications very beneficial in controlling her pain.  She denies
any problems with daytime sleepiness.  She does have occasional constipation
issues but does take some over-the-counter medications that is very beneficial.
 
The patient and family report that she did fall last Friday, tripped over some
shoes in the dark in her room.  She was not using a walker and fell in her
bedroom.  She is here today with slight bruising on her lip on the right side of
her face.  She reports that she did not hurt anything else during the fall.
 
ALLERGIES:  No known drug allergies.
 
CURRENT LIST OF MEDICATIONS:  Oxycodone 7.5/325 at bedtime, Percocet 5/325
b.i.d., ropinirole 0.5 mg 2 in the morning and 3 at night, amlodipine 5 mg
daily, Trileptal 150 mg at bedtime, MiraLax p.r.n., Livalo 4 mg daily, vitamin
D, Coreg 12.5 mg b.i.d., Aricept 10 mg daily, Colace, Effexor 75 daily,
Protonix, Plavix 75 mg daily, calcium, vitamin B12, Zofran, aspirin, Zyrtec.
 
PQRS:
1.  She has known arthritic changes in her hands, bilateral shoulder spine,
bilateral hips and knees.  Denies any rheumatoid arthritis.
2.  Height is 5 feet 2 inches, weight is 133, BMI is 24.
3.  Vital signs 153/70, pulse is 67, respirations 16, oxygen sat is 96.
4.  Pain score is 8/10.
5.  Denies dizziness.  Does need help walking.  She uses a cane.  We are
encouraging a walker.  Has fallen in the last 3 months.
6.  The patient is on Plavix as well as medicine for hypertension.
7.  Opioid therapy is greater than 6 weeks, therefore an opioid signed contract
is on the chart.  Her risk assessment tool is low.  Functional assessment is
 
 
 
[address]   [address]                     PAIN MANAGEMENT CONSULTATION  
_______________________________________________________________________________
 
Name:       DEEPIKACRISTELAMILCAR CASSIDY              Room #:                     REG CLI 
[facility]#:      [id number]                       Account #:      [id number]  
Admission:  11/06/19    Attend Phys:    Mary Petit     
Discharge:              Date of Birth:  [birth year]  
                                                          Report #: [id number]
                                                                    [id number]   
_______________________________________________________________________________
45/70.
8.  Recreational drug use, she denies.  She is not a smoker and occasionally
drinks alcohol.
 
According to the prescription monitoring system, she is filling appropriately
and is on time for her medication fill today with no aberrant fills.  She does
safeguard her medications at all times.  There is a recent drug screen on the
chart that is appropriate for her medications as well.
 
PHYSICAL EXAMINATION:
GENERAL:  This is a well-developed, well-nourished 84-year-old female who
appears her stated age, placing her current pain score today at 8/10.
HEENT:  Normocephalic, atraumatic.  Extraocular eye muscles are intact.  Mucous
membranes are moist.  She has slight ecchymosis area noted on her right lip and
chin today from a recent fall.
EXTREMITIES:  No clubbing, no cyanosis, no edema.
MUSCULOSKELETAL:  Lower extremity strength is symmetrical but deconditioned. 
Her straight leg raising is positive.  She walks with an antalgic gait using a
cane for ambulation.  She has pain, numbness and tingling present in her left
arm radiating down following the C6, C7, C8 dermatomal distribution into all of
her fingers.
 
ASSESSMENT:
1.  Symptomatic lumbar radiculopathy.
2.  Progressively worse spinal stenosis of lumbar spine.
3.  Lumbosacral spondylosis with radiculopathy.
4.  Chronic intractable pain.
5.  Degeneration of the lumbar spine.
6.  Osteoarthritis involving multiple joints.
7.  Cervical radiculopathy.
 
PLAN:
1.  We discussed treatment options with the patient today.  The patient is on
time for her medications.  They allow her to function quite well at home, care
for herself and her  despite her daughter does come in and help at times.
 Today, we will refill her Percocet 5/325 that she takes b.i.d., #60 given for
today and 4-week release and Percocet 7.5/325, #30 that she takes at bedtime. 
Scripts given for today, 4 week an 8-week release.
2.  We will refill her ropinirole 0.5 mg, #150 with 2 additional refills.
3.  The patient has had intermittent numbness and tingling throughout the years
in her left arm.  It has been fairly constant for the last 3-4 weeks.  We did
discuss on the dermatomal chart where this is coming from following the C6 to C8
dermatomal distribution.  We will try first a Medrol Dosepak to see if that is
beneficial in reducing some of the pain.  I did explain to her that Dr. Adonis Multani could do a cervical epidural on her which is similar to what she has in
her back periodically to help with her low back pain.  The patient verbalizes
 
 
 
[address]   [address]                     PAIN MANAGEMENT CONSULTATION  
_______________________________________________________________________________
 
Name:       LILLIANASTASIACRISTELAMILCAR CASSIDY              [id number]                       Account #:      [id number]  
Admission:  11/06/19    Attend Phys:    Mary Petit     
Discharge:              Date of Birth:  [birth year]  
                                                          Report #: [id number]
                                                                    [id number]   
_______________________________________________________________________________
understanding.  She is hopeful that the medicine will help.
4.  I discussed with Dr. Adonis Multani about decreasing her Trileptal.  She is
on a very low dose only at bedtime and it does have significant side effects. 
The patient is not experiencing any of these, but we will consider stopping
this medication weaning her off over a few weeks and stopping it altogether. 
5.  The patient is seen in collaboration with Dr. Adonis Multani today.
 
 
 
 
 
 
 
 
 
 
 
 
 
 
 
 
 
 
 
 
 
 
 
 
 
 
 
 
 
 
 
 
 
 
 
 
 
 
  <ELECTRONICALLY SIGNED>
   By: Mary Petit             
  11/07/19     1545
D: 11/06/19 1349                           _____________________________________
T: 11/07/19 0057                           Mary Petit               /nt

## 2019-12-20 ENCOUNTER — HOSPITAL ENCOUNTER (OUTPATIENT)
Dept: HOSPITAL 61 - PCVCCLINIC | Age: 84
Discharge: HOME | End: 2019-12-20
Attending: INTERNAL MEDICINE
Payer: MEDICARE

## 2019-12-20 DIAGNOSIS — I10: ICD-10-CM

## 2019-12-20 DIAGNOSIS — Z87.891: ICD-10-CM

## 2019-12-20 DIAGNOSIS — Z90.49: ICD-10-CM

## 2019-12-20 DIAGNOSIS — R94.31: ICD-10-CM

## 2019-12-20 DIAGNOSIS — Z79.899: ICD-10-CM

## 2019-12-20 DIAGNOSIS — Z79.82: ICD-10-CM

## 2019-12-20 DIAGNOSIS — Z72.89: ICD-10-CM

## 2019-12-20 DIAGNOSIS — I25.10: Primary | ICD-10-CM

## 2019-12-20 DIAGNOSIS — E78.00: ICD-10-CM

## 2019-12-20 DIAGNOSIS — I73.9: ICD-10-CM

## 2019-12-20 PROCEDURE — 93005 ELECTROCARDIOGRAM TRACING: CPT

## 2019-12-20 PROCEDURE — G0463 HOSPITAL OUTPT CLINIC VISIT: HCPCS

## 2020-01-23 ENCOUNTER — HOSPITAL ENCOUNTER (OUTPATIENT)
Dept: HOSPITAL 35 - RAD | Age: 85
End: 2020-01-23
Attending: INTERNAL MEDICINE
Payer: COMMERCIAL

## 2020-01-23 DIAGNOSIS — R06.00: Primary | ICD-10-CM

## 2020-01-23 DIAGNOSIS — Q25.46: ICD-10-CM

## 2020-01-23 DIAGNOSIS — M47.814: ICD-10-CM

## 2020-02-12 ENCOUNTER — HOSPITAL ENCOUNTER (OUTPATIENT)
Dept: HOSPITAL 35 - PAIN | Age: 85
End: 2020-02-12
Attending: CLINICAL NURSE SPECIALIST
Payer: COMMERCIAL

## 2020-02-12 VITALS — HEIGHT: 62.01 IN | WEIGHT: 140.8 LBS | BODY MASS INDEX: 25.58 KG/M2

## 2020-02-12 VITALS — SYSTOLIC BLOOD PRESSURE: 163 MMHG | DIASTOLIC BLOOD PRESSURE: 88 MMHG

## 2020-02-12 DIAGNOSIS — M47.818: ICD-10-CM

## 2020-02-12 DIAGNOSIS — M47.26: Primary | ICD-10-CM

## 2020-02-12 DIAGNOSIS — M15.9: ICD-10-CM

## 2020-02-12 DIAGNOSIS — Z79.899: ICD-10-CM

## 2020-02-12 DIAGNOSIS — M48.061: ICD-10-CM

## 2020-02-12 DIAGNOSIS — M51.16: ICD-10-CM

## 2020-02-12 NOTE — NUR
Pain Clinic Assessment:
 
1. History of Osteoarthritis:
FINGERS
BACK
   History of Rheumatoid Arthritis:
Not Applicable
 
2. Height: 5 ft. 2 in. 157.5 cm.
   Weight: 140.8 lb.  oz. 63.866 kg.
   Patient's BMI: 25.7
 
3. Vital Signs:
   BP: 163/88 Pulse: 74 Resp: 16
   Temp:  02 Sat: 94 ECG Mon:
 
4. Pain Intensity: 8
 
5. Fall Risk:
   Dizziness: Y  Needs help standing or walking: N
   Fallen in the last 3 months: N
   Fall risk comments:
USES CANE FULL TIME
SLIPPED ON SHOE THAT WAS IN MIDDLE OF FLOOR THAT DIDN'T
SEE AND HIT DOG KENNEL
 
6. Patient on Blood Thinner: Clopidogrel Bisulf(Plavix
 
7. History of Hypertension: Y
 
8. Opioid Therapy greater than 6 weeks: Y
   Opiate Contract Signed: 09/02/16
 
9. Risk Assessment Tool Provided: 1-LOW RISK
 
10. Functional Assessment Tool: 45/70
 
11. Recreational Drug Use: Never Drug Type:
    Tobacco Use: Never Smoker Tobacco Type:
       Amount or Packs/day:  How Many Years:
    Alcohol Use: Yes  Frequency: Daily Quant: 2

## 2020-02-18 NOTE — HPC
[facility]
[address]
[place]   [address]                     PAIN MANAGEMENT CONSULTATION  
_______________________________________________________________________________
 
Name:       JARED POE              Room #:                     REG [facility]#:      [id number]                       Account #:      [id number]  
Admission:  02/12/20    Attend Phys:    Mary Petit     
Discharge:              Date of Birth:  [birth year]  
                                                          Report #: [id number]
                                                                    [id number]   
_______________________________________________________________________________
THIS REPORT FOR:  
 
cc:  Lamar Dominguez MD,Lamar Petit,Mary NICHOLSON                                            ~
THIS REPORT FOR:   //name//                          
 
CC: Mary Dominguez
 
DATE OF SERVICE:  02/12/2020
 
 
CHIEF COMPLAINT:  Low back pain, bilateral leg pain.
 
HISTORY OF PRESENT ILLNESS:  This is an 85-year-old female who returns to the
pain clinic today for refill of her medications.  Today, she is reporting pain
in her lower back, bilateral legs and her left arm.  Pain score is an 8/10 per
her report.  She states that it is numbing tingles aching pain that is worse
with standing and walking and any movement.  She feels that sitting and lying
down as well as taking her medications are very beneficial.  She has reported
she is having some increased activity, going up and down the basement steps, but
has not fallen in the last few months.  She is using a walker at all times.  The
patient reports that her pain has been increasing and was wondering about
another possible injection.  She believes they are helpful, though sometimes
short-lived.  The patient is on Plavix, so she would need to be able to stop
that medication prior to an epidural.
 
ALLERGIES:  No known drug allergies.
 
CURRENT LIST OF MEDICATIONS:  Ropinirole 0.5 mg 2 tablets at noon, 3 at night,
oxycodone 7.5 at bedtime, oxycodone 5/325 b.i.d., Norvasc, Trileptal, Nyquil,
MiraLax, Advair, vitamin D, Coreg, Aricept, Colace, Effexor, Protonix, Plavix,
Ventolin inhaler, vitamin C, vitamin B, Zofran p.r.n., Tessalon Perles p.r.n.,
and Spiriva.
 
PQRS:
1.  She has osteoarthritis in her back and hands.  Denies any rheumatoid
arthritis.
2.  Height is 5 feet 2 inches, weight is 140, BMI is 25.
3.  Vital signs 163/88, pulse is 74, respirations 16, oxygen sat is 94.
4.  Pain score is 8/10.
5.  Complains of slight dizziness.  Does use a cane or walker at all times.  Has
not fallen in the last 3 months.
6.  The patient is on Plavix and also takes medicine for hypertension.  Opioid
therapy is greater than 6 weeks; therefore, an opioid signed contract is on the
 
 
 
[place] [address]                     PAIN MANAGEMENT CONSULTATION  
_______________________________________________________________________________
 
Name:       JARED POE              Room #:                     REG CLI 
[facility]#:      [id number]                       Account #:      [id number]  
Admission:  02/12/20    Attend Phys:    Mary Petit     
Discharge:              Date of Birth:  [birth year]  
                                                          Report #: [id number]
                                                                    [id number]   
_______________________________________________________________________________
chart.  Risk assessment tool is low.  Functional assessment is 45/70.
7.  Recreational drug use, she denies.  She is not a smoker and does drink wine
daily.
 
According to the prescription monitoring system, the patient is due to fill her
medications today.  She is filling them in a timely fashion.  According to the
CDC guidelines, her morphine mEq is 22.
 
PHYSICAL EXAMINATION:
GENERAL:  This is alert and orientated 85-year-old female who appears her stated
age, placing her pain score at 8/10.
HEENT:  Normocephalic, atraumatic.  Extraocular eye muscles are intact.
EXTREMITIES:  No clubbing, no cyanosis.
MUSCULOSKELETAL:  Lower extremity strength is symmetrical, though deconditioned.
 Uses a cane or walker for ambulation.  She has a slow antalgic gait. 
Tenderness in her lumbosacral area that radiates into her bilateral legs. 
Straight leg raising is positive.
 
ASSESSMENT:
1.  Symptomatic lumbar radiculopathy.
2.  Spinal stenosis of the lumbar spine.
3.  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
4.  Lumbosacral spondylosis with radiculopathy.
5.  Degeneration of lumbar spine.
6.  Osteoarthritis involving multiple joints.
 
We reviewed the fact that opiate medications are being used to provide analgesia
adequate to support activities of daily living, not attempting to achieve a
specific pain score on the 0-10 Visual Analog Scale.  The current opiate
medications are providing sufficient analgesia to allow the patient to
participate in activities of daily living.  The patient is not exhibiting any
aberrant behavior suggestive of drug diversion.  The patient is not having any
adverse reactions to medications.  The patient is not suffering from daytime
somnolence or mental acuity changes.  The patient is managing opiate-induced
constipation with appropriate over-the-counter agents and dietary
considerations.  The patient was counseled on concern for caution with operating
a motor vehicle while using opiate medications.
 
PLAN:
1.  We discussed treatment options with the patient today.  We discussed the
polypharmacy.  The patient is taking Requip, Trileptal, Effexor, Nyquil,
oxycodone and drinking wine with her meals, being decided to decrease her
oxycodone to 5 mg 3 times a day, making so she only has one strength of
medicine.  This will decrease her morphine milliequivalent dose slightly.  We
also encouraged her not to take any alcohol when she is using her opioid
medications.  The patient again has not fallen in the last few months, but we
 
 
 
[facility]
[address]
[place]   [address]                     PAIN MANAGEMENT CONSULTATION  
_______________________________________________________________________________
 
Name:       JARED POE              Room #:                     [facility]#:      [id number]                       Account #:      [id number]  
Admission:  02/12/20    Attend Phys:    Mary Petit     
Discharge:              Date of Birth:  [birth year]  
                                                          Report #: [id number]
                                                                    [id number]   
_______________________________________________________________________________
did discuss that she has fallen in the past and we worry about her with all
these medications that interact on her central nervous system.
2.  We discussed the possibility of another lumbar epidural steroid injection
Dr. Adonis Multani has performed this at the L5-S1 dermatomal distribution.  She
has found them beneficial in the past, though sometimes shorter lived than other
injections, but she is willing to try this again to help decrease some of her
increasing pain.  The patient is on Plavix.  She will need to stop this medicine
for 1 week.  We will have her scheduled next week since she has taken her dose
today.
3.  Dr. Adonis Multani will electronically send oxycodone 5/325, #90 to her
pharmacy for 3 months.  She is not in need of her Requip or her Trileptal.  The
patient is seen today in collaboration with Dr. Adonis Multani.
 
 
 
 
 
 
 
 
 
 
 
 
 
 
 
 
 
 
 
 
 
 
 
 
 
 
 
 
 
 
 
 
  <ELECTRONICALLY SIGNED>
   By: Mary Petit             
  02/18/20     0752
D: 02/12/20 1332                           _____________________________________
T: 02/12/20 2052                           Mary Petit               /nt

## 2020-02-26 ENCOUNTER — HOSPITAL ENCOUNTER (OUTPATIENT)
Dept: HOSPITAL 35 - PAIN | Age: 85
End: 2020-02-26
Attending: ANESTHESIOLOGY
Payer: COMMERCIAL

## 2020-02-26 VITALS — DIASTOLIC BLOOD PRESSURE: 72 MMHG | SYSTOLIC BLOOD PRESSURE: 179 MMHG

## 2020-02-26 VITALS — BODY MASS INDEX: 26.17 KG/M2 | WEIGHT: 144 LBS | HEIGHT: 62.01 IN

## 2020-02-26 DIAGNOSIS — M47.27: ICD-10-CM

## 2020-02-26 DIAGNOSIS — M51.16: Primary | ICD-10-CM

## 2020-02-26 DIAGNOSIS — Z79.899: ICD-10-CM

## 2020-02-26 DIAGNOSIS — M48.061: ICD-10-CM

## 2020-02-26 DIAGNOSIS — I10: ICD-10-CM

## 2020-02-26 DIAGNOSIS — Z79.891: ICD-10-CM

## 2020-02-26 DIAGNOSIS — M19.90: ICD-10-CM

## 2020-02-26 DIAGNOSIS — G89.29: ICD-10-CM

## 2020-02-26 DIAGNOSIS — Z98.890: ICD-10-CM

## 2020-02-26 NOTE — NUR
PATIENT EXPRESSED THIS VISIT THAT SHE HAS HAD SUICIDAL THOUGHTS AND A PLAN OF
KILLING HER DOGS PRIOR TO KILLING HERSELF TO THIS NURSE. I ATTEMPTED TO
DISCUSS POSITIVE ASPECTS OF HER LIFE WITH HER AND UPLITING ENCOURAGEMENT.
PATIENT STATED SHE FELT LIKE SHE WAS A BURDEN TO HER FAMILY AND HAD NO
SUPPORT, DESPITE DAUGHTER BEING PRESENT AT BEDSIDE. FELLOW NURSE CALLED
MANAGER AND WAS DIRECTED TO CASE MANAGEMENT. THIS NURSE SPOKE TO HOUSE
SUPERVISOR WHOM WAS WITH CASE MANAGEMENT. THEY STATED [facility] WAS TO PROVIDE
PATIENT WITH SUICIDAL HOTLINE INFORMATION OR TO ESCORT HER TO THE EMERGENCY
ROOM FOR EVALUATION AND TREATMENT. THESE SERVICES WERE OFFERED AND ENCOURAGED
TO PATIENT AND FAMILY. AT THAT TIME, PATIENT EXPLAINED TO DR. LOWRY THAT SHE
COULDN'T BE ADMITTED AND THE HOSPITAL BECAUSE SHE HAD SHOPPING TO DO. DR. LOWRY HAD A LONG TALK WITH DAUGHTER AT BEDSIDE AND SHE STATED SHE FELT SHE
WAS SAFE TO BE AT HOME AT THIS TIME WITH THE CURRENT RESOURCES GIVEN TO HER,
BUT DOES KNOW THAT SHE NEEDS HELP. [facility] WAS PROVIDED
WITH THEIR CONTACT INFO AND PROGRAMS AVAILABLE. DAUGHTER STATED SHE WOULD
REACH OUT TO THEM AND GET HER MOTHER SOME THERAPY AND ASSISTANCE. PATIENT
DISCHARGED HOME WITH DAUGHTER.

## 2020-02-26 NOTE — NUR
Pain Clinic Assessment:
 
1. History of Osteoarthritis:
FINGERS
BACK
   History of Rheumatoid Arthritis:
Not Applicable
 
2. Height: 5 ft. 2 in. 157.5 cm.
   Weight: 144.0 lb.  oz. 65.318 kg.
   Patient's BMI: 26.3
 
3. Vital Signs:
   BP: 179/72 Pulse: 63 Resp: 20
   Temp:  02 Sat: 100 ECG Mon:
 
4. Pain Intensity: 9
 
5. Fall Risk:
   Dizziness: N  Needs help standing or walking: Y
   Fallen in the last 3 months: N
   Fall risk comments:
USES CANE FULL TIME
SLIPPED ON SHOE THAT WAS IN MIDDLE OF FLOOR THAT DIDN'T
SEE AND HIT DOG KENNEL
 
6. Patient on Blood Thinner: Clopidogrel Bisulf(Plavix
 
7. History of Hypertension: Y
 
8. Opioid Therapy greater than 6 weeks: Y
   Opiate Contract Signed: 09/02/16
 
9. Risk Assessment Tool Provided: 1-LOW RISK
 
10. Functional Assessment Tool: 45/70
 
11. Recreational Drug Use: Never Drug Type:
    Tobacco Use: Never Smoker Tobacco Type:
       Amount or Packs/day:  How Many Years:
    Alcohol Use: Yes  Frequency: Daily Quant: 2 MUGS WINE

## 2020-03-04 NOTE — HPC
[facility]
[place]   [address]                     PAIN MANAGEMENT CONSULTATION  
_______________________________________________________________________________
 
Name:       JARED POE KHANH              Room #:                     REG [facility]#:      [id number]                       Account #:      [id number]  
Admission:  02/26/20    Attend Phys:    Adonis Multani DO  
Discharge:              Date of Birth:  [birth year]  
                                                          Report #: [id number]
                                                                    [id number]   
_______________________________________________________________________________
THIS REPORT FOR:  
 
cc:  Lamar Dominguez MD,Lamar Multani,Adonis ANDERSON DO                                          ~
 
 
DATE OF SERVICE:  02/26/2020
 
 
REFERRING PHYSICIAN:  Dr. Lamar Dominguez.
 
CHIEF COMPLAINT:  Low back pain, bilateral lower extremity pain.
 
HISTORY OF PRESENT ILLNESS:  As you know, the patient is an 85-year-old female
who has returned today in followup visit to undergo a lumbar epidural injection
under fluoroscopic guidance to address 9/10 pain that involves the low back and
bilateral lower extremities.  As you are aware, the patient suffers from
progressively worsening spinal stenosis, presenting as lumbar radicular pain. 
She has undergone epidural injections with good benefits.  She returns to
undergo next in the series.  She does report today increased depression and
social isolation.  She has considered suicide based on our evaluation.  She
states she has been very depressed of late.  She feels she has no family support
despite the fact that her daughter is here and does help with maintaining her
health.  She returns today for an epidural injection, but has also had concerns
of this ongoing depression.
 
ALLERGIES:  No known drug allergies.
 
CURRENT MEDICATIONS:  Ropinirole, oxycodone, Norvasc, Trileptal, Nyquil,
MiraLax, Advair, vitamin D, Coreg, Aricept, Colace, Effexor, Protonix, Plavix,
Ventolin, vitamin C, vitamin B, Zofran, Tessalon Perles and Spiriva.
 
SOCIAL HISTORY:  The patient denies current tobacco use, denies IV or illicit
drug use.  She is retired, accompanied by her daughter present in room today.
 
IMAGING:  No new imaging available.
 
PQRS:  The patient has known arthritic changes of the lumbar spine, bilateral
hands.  She denies rheumatoid arthritis.  She is placing pain intensity at 9/10.
 She is a fall risk, but has not had a fall in last 3 months.  She does use a
cane for ambulation.  She is on Plavix, but has discontinued the medication 7
days prior to today's procedure.  She is treated for hypertension.  She is on
chronic opioids, but has a low opioid addiction potential.  Pain impact score
45/70 indicating moderate interference of daily activities secondary to pain.
 
 
 
 
[address]   [address]                     PAIN MANAGEMENT CONSULTATION  
_______________________________________________________________________________
 
Name:       JARED POE              Room #:                     REG [facility] 
ARTURO#:      [id number]                       Account #:      [id number]  
Admission:  02/26/20    Attend Phys:    Adonis Multani DO  
Discharge:              Date of Birth:  [birth year]  
                                                          Report #: [id number]
                                                                    [id number]   
_______________________________________________________________________________
PHYSICAL EXAMINATION:
VITAL SIGNS:  Blood pressure 179/72, pulse 63, respiratory rate 20 and
unlabored.  The patient is 100% on room air.  Height 5 feet 2 inches tall,
weight 144 pounds, BMI calculated 26.3.
GENERAL:  Well-developed, well-nourished, well-hydrated, depressed fairly flat
affected 85-year-old female, stated age, appears appropriate.  She is placing
pain intensity at 9/10.
HEENT:  Normocephalic, atraumatic.  Pupils equal, round and reactive to light.
NEUROLOGIC:  Speech fluent.  The patient deemed a fair historian.
EXTREMITIES:  Show no clubbing, no cyanosis and no edema.
MUSCULOSKELETAL:  Lower extremity strength equal and symmetrical 5/5. 
Deconditioning noted bilaterally.  The patient is utilizing a cane for
ambulation and has an antalgic gait with forward flexion of lumbar spine. 
Stance is somewhat kyphotic.  She is tender to palpation over the lumbar spine. 
Seated straight leg raising negative.  Supine straight leg raising positive. 
Isrrael's test is negative.
 
ASSESSMENT:
1.  Symptomatic lumbar radiculopathy.
2.  Spinal stenosis of the lumbar spine, progressively worsening.
3.  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
4.  Lumbosacral spondylosis with radiculopathy.
5.  Degeneration of lumbar spine.
6.  Chronic intractable pain.
 
PLAN:
1.  The patient returns today in followup visit requesting to undergo lumbar
epidural injection under fluoroscopic guidance.  She has done very well with
previous epidural injections, hopeful to see similar improvement today.  She has
been advised risks and benefits of the procedure, states understood and wished
to proceed.
2.  The patient voices concerns about increasing depression and social
isolation.  She has also reported that she does have a potential plan for
suicide.  We have taken the liberty of contacting our social work here at the
hospital who had advised the patient to report to the Emergency Department. 
This does not appear to be an appropriate option for the patient.  We contacted
the , also advised that the patient would need to go to the
Emergency Department.  I do not feel the patient is eminent from a suicide
standpoint as she is providing information today about her desire to go shopping
for spring clothing and taking care of her animals at home.  I do feel that the
patient needs someone to talk to and I am somewhat discouraged by our lack of
resources here at [facility].  We have taken further precautions
and have provided the patient with [facility] at [facility], which
apparently does have the capability of seeing patients for typical depression
and social isolation seen with geriatric patients.  I believe this would be a
good place to start.  We were advised that if we did send the patient to the
 
 
 
[facility]
[address]
[place]   [address]                     PAIN MANAGEMENT CONSULTATION  
_______________________________________________________________________________
 
Name:       JARED POE              Room #:                     REG [facility]#:      [id number]                       Account #:      [id number]  
Admission:  02/26/20    Attend Phys:    Adonis Multani DO  
Discharge:              Date of Birth:  [birth year]  
                                                          Report #: [id number]
                                                                    [id number]   
_______________________________________________________________________________
Emergency Department, which the patient flatly refuses to do so, that
consultation would have been made for the [facility] group to see the patient
in the Emergency Department.  We have taken steps to avoid the ER visit as I do
not feel the patient is eminent in her desire to commit suicide and we have made
her an appointment with the [facility].  We have given the information to the
daughter who is present in room today.  They both feel better about their
current situations, state that they will contact our clinic at any time if
increasing depression or thoughts of suicide reoccur.  Again, I have taken upon
myself to contact the manager of our clinic and will begin process of
establishing a more appropriate option for us to facilitate the patient is being
seen from a psychiatric standpoint if we are going to continue to question the
patient in regards to suicidal ideation and safety at home.  This is of
paramount portion of our treatment options and needs to be addressed more
appropriately.
3.  No medication changes made at today's visit.  The patient will continue
current medical therapy as previously prescribed.
4.  The patient will restart her Plavix starting tonight and continue the Plavix
as directed.  We will see her back in followup visit on an as needed basis for
the next in the series of epidural injections.  We have advised the patient to
please follow up with senior clinic in regards to her depression and social
isolation.
 
PROCEDURE NOTE
 
DESCRIPTION OF PROCEDURE:  L5-S1 interlaminar epidural steroid injection under
fluoroscopic guidance.
 
After obtaining written consent, the patient was taken back to fluoroscopy
suite, placed in prone position with pillow under abdomen to decrease lumbar
lordosis.  Skin overlying lumbosacral area then prepped and draped in aseptic
fashion.  The L5-S1 vertebral interspace identified by AP fluoroscopy.  Skin and
subcutaneous tissue overlying target site of injection anesthetized with 3 mL of
1% lidocaine.
 
A 20-gauge 3-1/2 inch Tuohy needle advanced under fluoroscopic guidance towards
the epidural space using a parasagittal approach.  Epidural space identified
using loss of resistance to air technique.  After negative aspiration for heme
or cerebrospinal fluid, 1 mL of Omnipaque injected.  Lumbar epidurogram
confirmed using both AP and lateral fluoroscopy.  After negative aspiration for
heme or cerebrospinal fluid, 5 mL of a solution containing 2 mL 40 mg per mL, 80
mg total triamcinolone along with 3 mL of lidocaine 1% injected slowly.  Needle
retracted FDC, flushed with 1 mL of 1% lidocaine and removed.  Sterile
bandage placed over injection site.  No new motor deficits present in lower
extremity following procedure.
 
The patient tolerated the procedure well, carefully escorted to recovery room in
 
 
 
[place] [address]                     PAIN MANAGEMENT CONSULTATION  
_______________________________________________________________________________
 
Name:       JARED POE              Room #:                     [facility]#:      [id number]                       Account #:      [id number]  
Admission:  02/26/20    Attend Phys:    Adonis Multani DO  
Discharge:              Date of Birth:  [birth year]  
                                                          Report #: [id number]
                                                                    [id number]   
_______________________________________________________________________________
stable condition.  No apparent complications.  After meeting discharge criteria,
the patient discharged home.
 
 
 
 
 
 
 
 
 
 
 
 
 
 
 
 
 
 
 
 
 
 
 
 
 
 
 
 
 
 
 
 
 
 
 
 
 
 
 
 
 
 
  <ELECTRONICALLY SIGNED>
   By: Adonis Multani DO          
  03/04/20     1152
D: 03/03/20 0803                           _____________________________________
T: 03/03/20 1242                           Adonis Multani DO            /nt

## 2020-06-10 ENCOUNTER — HOSPITAL ENCOUNTER (OUTPATIENT)
Dept: HOSPITAL 35 - PAIN | Age: 85
End: 2020-06-10
Attending: CLINICAL NURSE SPECIALIST
Payer: COMMERCIAL

## 2020-06-10 VITALS — HEIGHT: 62.01 IN | WEIGHT: 144.8 LBS | BODY MASS INDEX: 26.31 KG/M2

## 2020-06-10 VITALS — SYSTOLIC BLOOD PRESSURE: 179 MMHG | DIASTOLIC BLOOD PRESSURE: 73 MMHG

## 2020-06-10 DIAGNOSIS — M19.041: ICD-10-CM

## 2020-06-10 DIAGNOSIS — M51.16: Primary | ICD-10-CM

## 2020-06-10 DIAGNOSIS — M47.27: ICD-10-CM

## 2020-06-10 DIAGNOSIS — M19.042: ICD-10-CM

## 2020-06-10 DIAGNOSIS — F32.9: ICD-10-CM

## 2020-06-10 NOTE — NUR
Pain Clinic Assessment:
 
1. History of Osteoarthritis:
FINGERS
BACK
   History of Rheumatoid Arthritis:
Not Applicable
 
2. Height: 5 ft. 2 in. 157.5 cm.
   Weight: 144.8 lb.  oz. 65.681 kg.
   Patient's BMI: 26.5
 
3. Vital Signs:
   BP: 179/73 Pulse: 80 Resp: 20
   Temp:  02 Sat: 96 ECG Mon:
 
4. Pain Intensity: 9
 
5. Fall Risk:
   Dizziness: N  Needs help standing or walking: Y
   Fallen in the last 3 months: N
   Fall risk comments:
USES CANE FULL TIME
SLIPPED ON SHOE THAT WAS IN MIDDLE OF FLOOR THAT DIDN'T
SEE AND HIT DOG KENNEL
 
6. Patient on Blood Thinner: Clopidogrel Bisulf(Plavix
 
7. History of Hypertension: Y
 
8. Opioid Therapy greater than 6 weeks: Y
   Opiate Contract Signed: 09/02/16
 
9. Risk Assessment Tool Provided: 1-LOW RISK
 
10. Functional Assessment Tool: 45/70
 
11. Recreational Drug Use: Never Drug Type:
    Tobacco Use: Never Smoker Tobacco Type:
       Amount or Packs/day:  How Many Years:
    Alcohol Use: Yes  Frequency: Daily Quant: 2 glass tankards

## 2020-06-16 NOTE — HPC
[facility]
[address]
[place]   [address]                     PAIN MANAGEMENT CONSULTATION  
_______________________________________________________________________________
 
Name:       JARED POE              Room #:                     REG [facility].#:      [id number]                       Account #:      [id number]  
Admission:  06/10/20    Attend Phys:    Mary Petit     
Discharge:              Date of Birth:  [birth year]  
                                                          Report #: [id number]
                                                                    [id number]   
_______________________________________________________________________________
THIS REPORT FOR:  
 
cc:  Lamar Dominguez MD,Lamar Petit,Mary NICHOLSON                                            ~
CC: Mary Dominguez
 
DATE OF SERVICE:  06/10/2020
 
 
HISTORY OF PRESENT ILLNESS:  As you know, this is an 85-year-old female who
returns to the pain clinic today for refill of her opioid medications.  She does
report that the lumbar epidural steroid injection that Dr. Adonis Multani
performed in February did help for a few weeks, but her pain has slowly
returned.  She feels that they are still beneficial despite the short term
effects.  She continues to take her opioid medication during the COVID outbreak.
 Her family reports that she was taking less because she was less active.  She
has been able to go about 6 weeks between each prescription.  She is here today
requesting refills of her oxycodone.
 
The patient is stating a pain score of 9/10.  It is a numbness, tingly, aching
feeling, worse with walking, standing and prolonged activity.  She feels that
the medications have been beneficial as well as sitting down.  She does complain
of some ongoing arm pain, which is a new pain generator.  She continues to have
her low back pain that radiates into her hips and buttocks.  She has seen Dr. Dominguez today, her primary care doctor who is going to order an EMG of her upper
extremities to see if she is having nerve issues.
 
At her last visit with Dr. Adonis Multani, she did complain of severe depression
and discussed suicidal thoughts.  She did talk with Dr. Dominguez about this. 
Currently they have not increased her antidepressants, but it was brought to the
attention of her primary care doctor.  The patient states that she does not have
those feelings any longer.  She enjoys taking care of her animals at home and is
enjoying being outside now that the COVID restrictions have lifted some.  She
feels that she was just very depressed being at home.
 
ALLERGIES:  No known drug allergies.
 
CURRENT LIST OF MEDICATIONS:  Trelegy Ellipta, Percocet 5/325, ropinirole 0.5 mg
2 at noon and 3 at night, amlodipine, MiraLax, Livalo, vitamin D, Coreg,
Aricept, Colace, Effexor 150 mg, Protonix, Plavix, Ventolin, vitamin C, vitamin
B and Zyrtec.
 
PQRS:
 
 
 
[address]   [address]                     PAIN MANAGEMENT CONSULTATION  
_______________________________________________________________________________
 
Name:       JARED POE              Room #:                     REG [facility]#:      [id number]                       Account #:      [id number]  
Admission:  06/10/20    Attend Phys:    Mary Petit     
Discharge:              Date of Birth:  [birth year]  
                                                          Report #: [id number]
                                                                    [id number]   
_______________________________________________________________________________
1.  The patient does have arthritic changes in her back and hands.  Denies any
rheumatoid arthritis.
2.  Height is 5 feet 2 inches, weight is 144, BMI is 26.
3.  Vital signs 179/73, pulse is 80, respirations 20, oxygen sat is 96.
4.  Pain score is 9/10.
5.  Denies dizziness.  Does need help walking.  She uses a cane at all times. 
Has not fallen in the last 3 months.
6.  The patient is on Plavix as well as medicines for hypertension.
7.  Opioid therapy is greater than 6 weeks; therefore, an opioid signed contract
is on the chart.  Risk assessment tool is low.  Functional assessment is 45/70.
8.  Recreational drug use, she denies.  She is not a smoker and occasionally
drinks alcohol every night.
 
According to the prescription monitoring system, the patient's prescriptions
were recently filled on the 1st.  She is not needing those at this time.  Her
morphine mEq according to the CDC guidelines is 22.  She has been filling
approximately every 6 weeks throughout this COVID outbreak, we will collect a
random urine drug screen on her today.
 
PHYSICAL EXAMINATION:
GENERAL:  This is a well-developed, well-nourished, well-hydrated 85-year-old
female who appears her stated age, placing her current pain score at 9/10.  She
is alert and orientated.
HEENT:  Normocephalic, atraumatic.  Pupils equal, round and reactive to light. 
She is wearing a mask.
EXTREMITIES:  No clubbing, no cyanosis, no edema.
MUSCULOSKELETAL:  Lower extremity strength judged to be 5/5 in all major muscle
groups.  She does have deconditioning noted bilaterally and uses a cane for
ambulation.  She is kyphotic.  She has tenderness over her lumbar spine.  Seated
straight leg raising is negative.  Complains of numbness and tingling in her
bilateral arms.
 
ASSESSMENT:
1.  Symptomatic lumbar radiculopathy.
2.  Spinal stenosis of lumbar spine.
3.  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
4.  Lumbosacral spondylosis with radiculopathy.
5.  Degeneration of the lumbar spine.
6.  Chronic intractable pain.
7.  Depression.  
 
We reviewed the fact that opiate medications are being used to provide analgesia
adequate to support activities of daily living, not attempting to achieve a
specific pain score on the 0-10 Visual Analog Scale.  The current opiate
medications are providing sufficient analgesia to allow the patient to
participate in activities of daily living.  The patient is not exhibiting any
 
 
 
[facility]
[address]
[place]   [address]                     PAIN MANAGEMENT CONSULTATION  
_______________________________________________________________________________
 
Name:       JARED POE              Room #:                     REG [facility]#:      [id number]                       Account #:      [id number]  
Admission:  06/10/20    Attend Phys:    Mary Petit     
Discharge:              Date of Birth:  [birth year]  
                                                          Report #: [id number]
                                                                    [id number]   
_______________________________________________________________________________
aberrant behavior suggestive of drug diversion.  The patient is not having any
adverse reactions to medications.  The patient is not suffering from daytime
somnolence or mental acuity changes.  The patient is managing opiate-induced
constipation with appropriate over-the-counter agents and dietary
considerations.  The patient was counseled on concern for caution with operating
a motor vehicle while using opiate medications.
 
PLAN:
1.  We discussed treatment options with the patient today.  The patient states
she has recently seen Dr. Dominguez per the family's report.  She is going to be
scheduled for an EMG, the patient is complaining of numbness and tingling in her
lower extremities, especially upon raising her arms above her head.
2.  We will refill her Percocet 5/325, #90.  Dr. Adonis Multani will send these
to her pharmacy for today, 4-week and 8-week release.
3.  I will send her ropinirole 0.5 mg, #150.  The patient takes 2 at noon and 3
at night.  This was sent with 5 additional refills.
4.  The patient does continue on her Trileptal 150 mg at bedtime.  According to
the chart,  she has refills through July.  At that time, refill is needed, they
are to notify [facility] and we will allow refills of that medication.
5.  The patient gave us a urine specimen for random drug screen today.  The
patient is seen in collaboration with Dr. Adonis Multani.  She will return in 3
months.
 
 
 
 
 
 
 
 
 
 
 
 
 
 
 
 
 
 
 
 
 
 
  <ELECTRONICALLY SIGNED>
   By: Mary Petit             
  06/16/20     0730
D: 06/10/20 1520                           _____________________________________
T: 06/10/20 1854                           Mary Petit               /nt

## 2020-06-29 ENCOUNTER — HOSPITAL ENCOUNTER (OUTPATIENT)
Dept: HOSPITAL 35 - SJCVCIMAG | Age: 85
End: 2020-06-29
Attending: INTERNAL MEDICINE
Payer: COMMERCIAL

## 2020-06-29 DIAGNOSIS — I73.9: ICD-10-CM

## 2020-06-29 DIAGNOSIS — E78.5: ICD-10-CM

## 2020-06-29 DIAGNOSIS — R94.31: Primary | ICD-10-CM

## 2020-06-29 DIAGNOSIS — I07.1: ICD-10-CM

## 2020-06-29 DIAGNOSIS — I10: ICD-10-CM

## 2020-06-29 DIAGNOSIS — I25.10: ICD-10-CM

## 2020-06-29 DIAGNOSIS — E78.00: ICD-10-CM

## 2020-07-30 ENCOUNTER — HOSPITAL ENCOUNTER (OUTPATIENT)
Dept: HOSPITAL 35 - SJCVCIMAG | Age: 85
End: 2020-07-30
Attending: INTERNAL MEDICINE
Payer: COMMERCIAL

## 2020-07-30 DIAGNOSIS — Z79.899: ICD-10-CM

## 2020-07-30 DIAGNOSIS — E78.00: ICD-10-CM

## 2020-07-30 DIAGNOSIS — I70.203: Primary | ICD-10-CM

## 2020-07-30 DIAGNOSIS — Z87.891: ICD-10-CM

## 2020-07-30 DIAGNOSIS — J44.9: ICD-10-CM

## 2020-07-30 DIAGNOSIS — I10: ICD-10-CM

## 2020-07-30 DIAGNOSIS — I65.23: ICD-10-CM

## 2020-07-30 DIAGNOSIS — I25.10: ICD-10-CM

## 2020-12-23 ENCOUNTER — HOSPITAL ENCOUNTER (OUTPATIENT)
Dept: HOSPITAL 35 - SJCVC | Age: 85
End: 2020-12-23
Attending: FAMILY MEDICINE
Payer: COMMERCIAL

## 2020-12-23 DIAGNOSIS — J44.9: ICD-10-CM

## 2020-12-23 DIAGNOSIS — E78.00: ICD-10-CM

## 2020-12-23 DIAGNOSIS — I73.9: ICD-10-CM

## 2020-12-23 DIAGNOSIS — E78.5: ICD-10-CM

## 2020-12-23 DIAGNOSIS — I25.10: ICD-10-CM

## 2020-12-23 DIAGNOSIS — G89.4: ICD-10-CM

## 2020-12-23 DIAGNOSIS — I10: Primary | ICD-10-CM

## 2021-01-18 ENCOUNTER — HOSPITAL ENCOUNTER (OUTPATIENT)
Dept: HOSPITAL 35 - SJCVC | Age: 86
End: 2021-01-18
Attending: UROLOGY
Payer: COMMERCIAL

## 2021-01-18 DIAGNOSIS — I10: ICD-10-CM

## 2021-01-18 DIAGNOSIS — I73.9: ICD-10-CM

## 2021-01-18 DIAGNOSIS — Z98.61: ICD-10-CM

## 2021-01-18 DIAGNOSIS — R94.31: Primary | ICD-10-CM

## 2021-01-18 DIAGNOSIS — E78.00: ICD-10-CM

## 2021-01-18 DIAGNOSIS — Z79.899: ICD-10-CM

## 2021-01-18 DIAGNOSIS — J44.9: ICD-10-CM

## 2021-01-18 DIAGNOSIS — I25.10: ICD-10-CM

## 2021-01-18 DIAGNOSIS — R60.9: ICD-10-CM

## 2021-01-18 DIAGNOSIS — Z87.891: ICD-10-CM

## 2021-01-26 ENCOUNTER — HOSPITAL ENCOUNTER (OUTPATIENT)
Dept: HOSPITAL 35 - PAIN | Age: 86
End: 2021-01-26
Attending: CLINICAL NURSE SPECIALIST
Payer: COMMERCIAL

## 2021-01-26 VITALS — SYSTOLIC BLOOD PRESSURE: 159 MMHG | DIASTOLIC BLOOD PRESSURE: 75 MMHG

## 2021-01-26 VITALS — BODY MASS INDEX: 24.15 KG/M2 | HEIGHT: 60 IN | WEIGHT: 123 LBS

## 2021-01-26 DIAGNOSIS — F32.9: ICD-10-CM

## 2021-01-26 DIAGNOSIS — M48.061: ICD-10-CM

## 2021-01-26 DIAGNOSIS — M47.27: ICD-10-CM

## 2021-01-26 DIAGNOSIS — Z79.899: ICD-10-CM

## 2021-01-26 DIAGNOSIS — G89.29: ICD-10-CM

## 2021-01-26 DIAGNOSIS — F11.20: ICD-10-CM

## 2021-01-26 DIAGNOSIS — M51.16: Primary | ICD-10-CM

## 2021-01-26 NOTE — NUR
Pain Clinic Assessment:
 
1. History of Osteoarthritis:
FINGERS
BACK
   History of Rheumatoid Arthritis:
Not Applicable
 
2. Height: 5 ft. 0 in. 152.4 cm.
   Weight: 123.0 lb.  oz. 55.792 kg.
   Patient's BMI: 24.0
 
3. Vital Signs:
   BP: 159/75 Pulse: 71 Resp: 16
   Temp:  02 Sat: 95 ECG Mon:
 
4. Pain Intensity: 8
 
5. Fall Risk:
   Dizziness: N  Needs help standing or walking: Y
   Fallen in the last 3 months: Y
   Fall risk comments:
USES CANE FULL TIME
SLIPPED ON SHOE THAT WAS IN MIDDLE OF FLOOR THAT DIDN'T
SEE AND HIT DOG KENNEL
 
6. Patient on Blood Thinner: Clopidogrel Bisulf(Plavix
 
7. History of Hypertension: Y
 
8. Opioid Therapy greater than 6 weeks: Y
   Opiate Contract Signed: 09/02/16
 
9. Risk Assessment Tool Provided: 1-LOW RISK
 
10. Functional Assessment Tool: 45/70
 
11. Recreational Drug Use: Never Drug Type:
    Tobacco Use: Former Smoker Tobacco Type:
       Amount or Packs/day:  How Many Years:
    Alcohol Use: Yes  Frequency: Daily Quant: 2

## 2021-01-27 NOTE — HPC
[facility]
[place]   [address]                     PAIN MANAGEMENT CONSULTATION  
_______________________________________________________________________________
 
Name:       LILLIANASTASIACRISTELAMILCAR CASSIDY              [facility] #:                     [facility]      [id number]                       Account #:      [id number]  
Admission:  01/26/21    Attend Phys:    Mary Petit     
Discharge:              Date of Birth:  [birth year]  
                                                          Report #: [id number]
                                                                    [id number]   
_______________________________________________________________________________
THIS REPORT FOR:  
 
cc:  Lamar Dominguez MD, Melanie MD Hocker,Mary NICHOLSON                                            ~
DATE OF SERVICE:  01/26/2021
 
 
CHIEF COMPLAINT:  Low back pain, lumbar radiculopathy.
 
HISTORY OF PRESENT ILLNESS:  This is an 86-year-old female who returns to the
clinic today with her daughter.  She is requesting refills of her opioid
medications as well as guidance on her ongoing weakness in her lower
extremities.  The patient reports recently falling greater than 1 month ago. 
She did not seek medical attention after this fall.  She states that she had
hurt her left rib cage and it still continues to be tender today.  She has
ongoing low back pain and bilateral lower extremity pain with weakness.  She
reports falling quite frequently, but does not use the assistance of a cane or
walker in her house.  She describes her pain as numbness, aching sensation in
her lower extremities that is increased with walking, standing or prolonged
activity.  She believes medication as well as sitting down is beneficial. 
Today, she rates her pain score at 8/10.
 
The patient's daughter reports that she had an MRI after our last visit in
October.  The patient's primary care doctor did refer her to an orthopedic who
then in turn referred her to a pain management doctor at [facility].  According
to Alissa, the daughter, the patient was going to undergo medial branch blocks
and radiofrequency ablationing but then became sick in early January, so she
canceled those appointments.  The patient states she is unaware that our pain
physicians do the same procedures and did not call to question if we perform
them.
 
ALLERGIES:  No known drug allergies.
 
CURRENT LIST OF MEDICATIONS:  Estroven, carvedilol, Percocet 5/325 t.i.d.,
levothyroxine, ropinirole, rhubarb, Ellipta, amlodipine, Trileptal, oxygen,
Nyquil, MiraLax, Livalo, vitamin D, Aricept, Colace, Nasonex, Effexor, Protonix,
Plavix, albuterol inhaler p.r.n., vitamin C, vitamin B, Zofran, and Zyrtec.
 
PQRS:
1.  She has arthritic changes in her back and hands.  Denies any rheumatoid
arthritis.
2.  Height is 5 feet, weight is 123.  This is a decrease from 138 at her last
visit.  BMI is 24.
3.  Vital signs 158/75, pulse is 71, respirations 16, oxygen sat is 95%.
4.  Pain score is 8/10.
 
 
 
[address]   [address]                     PAIN MANAGEMENT CONSULTATION  
_______________________________________________________________________________
 
Name:       LILLIANASTASIACRISTELAMILCAR CASSIDY              [facility] #:                     [facility]      [id number]                       Account #:      [id number]  
Admission:  01/26/21    Attend Phys:    Mary Petit     
Discharge:              Date of Birth:  [birth year]  
                                                          Report #: [id number]
                                                                    [id number]   
_______________________________________________________________________________
5.  Complains of slight dizziness.  Does need assistance with ambulation and has
fallen multiple times in her last 3 months, and did not seek medical attention.
6.  The patient is on Plavix as well as medicine for hypertension.
7.  Opioid therapy is greater than 6 weeks; therefore, an opioid signed contract
on the chart.  Risk assessment is low.  Functional assessment is 45/70.
8.  Recreational drug use, she is a former smoker and occasionally drinks
alcohol.
 
According to the prescription monitoring system, she is filling in a timely
fashion.  She is due to fill her medications this week by Dr. Multani.  Her
morphine mEq according to the CDC guidelines was 22 MME.
 
PHYSICAL EXAMINATION:
GENERAL:  This is alert and orientated, well-developed 86-year-old female who
appears her stated age, placing her current pain score today at 8/10.
HEENT:  Normocephalic, atraumatic.  Extraocular eye muscles are intact.  She is
wearing a mask and glasses.
EXTREMITIES:  No clubbing, no cyanosis, no edema.
MUSCULOSKELETAL:  She has tenderness in her left rib cage with no ecchymosis
noted, weakness in her lower extremities due to deconditioning, uses a cane
occasionally for ambulation.  She has tenderness in the lumbosacral region that
is increased with provocation testing.  Pain also radiates down her bilateral
legs, greater on the left than the right, following the L5-S1 dermatomal
distribution.
 
IMPRESSION:
1.  Symptomatic lumbar radiculopathy.
2.  Spinal stenosis of the lumbar spine.
3.  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
4.  Lumbosacral spondylosis.
5.  Degeneration of the lumbar spine.
6.  Chronic intractable pain.
7.  Depression.
8.  Chronic opioid medications under terms of written agreement.
 
RADIOLOGY FINDINGS:
1.  MRI of the lumbar spine from 10/20/2020 shows advanced lumbar spondylosis
and scoliosis.
2.  Multilevel degenerative disk disease with facet arthroscopy.
3.  Spinal canal stenosis and neural foraminal stenosis, most pronounced at the
L3-L4 and L4-L5 levels.
 
PLAN:
1.  We discussed treatment options with the patient today.  I explained to her
that Dr. Multani performed the same procedures that the [facility] Pain
physicians will be going to provide for her if she is interested in and he feels
 
 
 
[facility]
[address]
[place]   [address]                     PAIN MANAGEMENT CONSULTATION  
_______________________________________________________________________________
 
Name:       DEEPIKACRISTELAMILCAR CASSIDY              Room #:                     REG [facility].#:      [id number]                       Account #:      [id number]  
Admission:  01/26/21    Attend Phys:    Mary Petit     
Discharge:              Date of Birth:  [birth year]  
                                                          Report #: [id number]
                                                                    [id number]   
_______________________________________________________________________________
that these will be appropriate, he would possibly start with the facet joint
block injection, though we cannot guarantee 100% pain relief that the patient is
requesting at this time.  The patient has decided to hold off on any further
injections.
2.  We did discuss her increasing weakness in her lower extremities.  This comes
from worsening stenosis in her back and I encouraged her to use a cane or walker
at all times.  The patient unfortunately at home does not use any assistive
devices and has fallen quite frequently and not sought medical attention.  I did
remind the patient she is on a blood thinner and a fall could cause serious
damage.  At this time, she is not wanting to start using a walker at all times.
3.  We will refill her oxycodone 5/325, #90.  This will be sent electronically
by Dr. Adonis Multani to her pharmacy for 3 months and we will send her
ropinirole 0.5 mg, a total of 5 tablets a day to her mail-off pharmacy.
4.  We discussed her oxcarbazepine.  Her daughter is unsure if this is
beneficial in helping with her pain.  She is on a low dose of 150 mg at night. 
We will stop this medication slowly by decreasing every other day for a week
that is tapering off and discuss later if her symptoms return.
5.  The patient will return in 3 months or as needed if she changes her mind
regarding an injection by Dr. Adonis Multani.
6.  The patient is seen today in collaboration with him.
 
 
 
 
 
 
 
 
 
 
 
 
 
 
 
 
 
 
 
 
 
 
 
 
  <ELECTRONICALLY SIGNED>
   By: Mary Petit             
  01/27/21     1558
D: 01/26/21 1404                           _____________________________________
T: 01/26/21 2025                           Mary Petit               /nt

## 2021-02-02 ENCOUNTER — HOSPITAL ENCOUNTER (OUTPATIENT)
Dept: HOSPITAL 35 - SJCVCIMAG | Age: 86
End: 2021-02-02
Attending: RADIOLOGY
Payer: COMMERCIAL

## 2021-02-02 DIAGNOSIS — I77.9: ICD-10-CM

## 2021-02-02 DIAGNOSIS — I10: ICD-10-CM

## 2021-02-02 DIAGNOSIS — Z98.890: ICD-10-CM

## 2021-02-02 DIAGNOSIS — I70.203: Primary | ICD-10-CM

## 2021-02-02 DIAGNOSIS — E78.00: ICD-10-CM

## 2021-02-02 DIAGNOSIS — Z87.891: ICD-10-CM

## 2021-02-02 DIAGNOSIS — Z90.49: ICD-10-CM

## 2021-02-02 DIAGNOSIS — Z79.899: ICD-10-CM

## 2021-02-02 DIAGNOSIS — J44.9: ICD-10-CM

## 2021-02-02 DIAGNOSIS — I25.10: ICD-10-CM

## 2021-02-02 DIAGNOSIS — Z95.828: ICD-10-CM

## 2021-05-11 ENCOUNTER — HOSPITAL ENCOUNTER (OUTPATIENT)
Dept: HOSPITAL 35 - PAIN | Age: 86
End: 2021-05-11
Attending: CLINICAL NURSE SPECIALIST
Payer: COMMERCIAL

## 2021-05-11 ENCOUNTER — HOSPITAL ENCOUNTER (OUTPATIENT)
Dept: HOSPITAL 35 - CAT | Age: 86
End: 2021-05-11
Attending: INTERNAL MEDICINE
Payer: COMMERCIAL

## 2021-05-11 VITALS — WEIGHT: 126 LBS | HEIGHT: 60 IN | BODY MASS INDEX: 24.74 KG/M2

## 2021-05-11 VITALS — SYSTOLIC BLOOD PRESSURE: 181 MMHG | DIASTOLIC BLOOD PRESSURE: 76 MMHG

## 2021-05-11 DIAGNOSIS — I51.7: ICD-10-CM

## 2021-05-11 DIAGNOSIS — R91.1: Primary | ICD-10-CM

## 2021-05-11 DIAGNOSIS — I25.10: ICD-10-CM

## 2021-05-11 DIAGNOSIS — M47.27: ICD-10-CM

## 2021-05-11 DIAGNOSIS — F32.9: ICD-10-CM

## 2021-05-11 DIAGNOSIS — F03.90: ICD-10-CM

## 2021-05-11 DIAGNOSIS — G89.29: ICD-10-CM

## 2021-05-11 DIAGNOSIS — J98.4: ICD-10-CM

## 2021-05-11 DIAGNOSIS — M51.16: Primary | ICD-10-CM

## 2021-05-11 NOTE — NUR
Pain Clinic Assessment:
 
1. History of Osteoarthritis:
FINGERS
BACK
   History of Rheumatoid Arthritis:
Not Applicable
 
2. Height: 5 ft. 0 in. 152.4 cm.
   Weight: 126.0 lb.  oz. 57.153 kg.
   Patient's BMI: 24.6
 
3. Vital Signs:
   BP: 181/76 Pulse: 69 Resp: 16
   Temp:  02 Sat: 93 ECG Mon:
 
4. Pain Intensity: 10
 
5. Fall Risk:
   Dizziness: N  Needs help standing or walking: N
   Fallen in the last 3 months: Y
   Fall risk comments:
USES CANE FULL TIME
SLIPPED ON SHOE THAT WAS IN MIDDLE OF FLOOR THAT DIDN'T
SEE AND HIT DOG KENNEL
 
6. Patient on Blood Thinner: Clopidogrel Bisulf(Plavix
 
7. History of Hypertension: Y
 
8. Opioid Therapy greater than 6 weeks: Y
   Opiate Contract Signed: 09/02/16
 
9. Risk Assessment Tool Provided: 1-LOW RISK
 
10. Functional Assessment Tool: 45/70
 
11. Recreational Drug Use: Never Drug Type:
    Tobacco Use: Former Smoker Tobacco Type:
       Amount or Packs/day:  How Many Years:
    Alcohol Use: Yes  Frequency:  Quant:

## 2021-05-12 NOTE — HPC
[facility]
[place]   [address]                     PAIN MANAGEMENT CONSULTATION  
_______________________________________________________________________________
 
Name:       JARED POE              Room #:                     REG [facility].#:      [id number]                       Account #:      [id number]  
Admission:  05/11/21    Attend Phys:    Mary Petit     
Discharge:              Date of Birth:  [birth year]  
                                                          Report #: [id number]
                                                                    [id number] 
_______________________________________________________________________________
THIS REPORT FOR:  
 
cc:  Lamar Dominguez MD, Melanie MD Hocker,Mary NICHOLSON                                            ~
DOC #: [id number]
 
cc:Adonis Multani DO, Melanie Smolen, MD
 
DATE OF SERVICE: 05/11/2021
 
CHIEF COMPLAINT:  Low back pain, lumbar radiculopathy and neck pain.
 
HISTORY OF PRESENT ILLNESS:  This is an 86-year-old female who returns to the 
pain clinic today with her daughter who is present today to renew her opioid 
medications.  Today, the patient is complaining of pain at 10/10, but most 
significantly today, she is complaining of bilateral wrist pain. Throughout our 
visit, she did not mention her neck or her lower back.  She just complained 
intensely of carpal tunnel issues.  She states that her wrists keep her up at 
night with numbness and tingling in her fingers.  The patient is treated by Dr. Adonis Multani with oxycodone for her low back pain, which is normally worse with
walking and standing.  The patient reports having increasing pain due to 
gardening since she is more active in the summer time and today she is 
requesting refills of that medication.
 
The patient has some memory issues and is taking Aricept.  Today, she is very 
angry at her daughter throughout most of our visit today.  Per the daughter's 
report, this has been increasing in frequency the angriness that the patient 
expresses towards her.
 
ALLERGIES:  No known drug allergies.
 
CURRENT LIST OF MEDICATIONS:  Oxycodone 5/325, levothyroxine, ropinirole, 
Trelegy, amlodipine, oxygen at night, MiraLax, vitamin D, Aricept, Colace,
Mucinex, Effexor, Protonix, Plavix, vitamin C, vitamin B complex, Zofran, 
Tessalon Perles, and Zyrtec.
 
PATIENT'S PQRS:
1.  She has arthritic changes in her hands and back.  Denies any rheumatoid 
arthritis.  Height is 5 feet, weight is 126.  BMI is 24.
2.  Vital Signs:  181/76, pulse is 69, respirations 16, oxygen sat is 93%.
3.  Pain score is 10/10.
4.  Denies dizziness.  Does use a cane for ambulation, has fallen in the last 3 
months.
5.  Patient is on Plavix as well as medication for hypertension.
6.  Opioid therapy is greater than 6 weeks; therefore, an opioid signed contract
 
 
 
[address]   [address]                     PAIN MANAGEMENT CONSULTATION  
_______________________________________________________________________________
 
Name:       JARED POE              Room #:                     REG [facility]#:      [id number]                       Account #:      [id number]  
Admission:  05/11/21    Attend Phys:    Mary Petit     
Discharge:              Date of Birth:  [birth year]  
                                                          Report #: [id number]
                                                                    [id number] 
_______________________________________________________________________________
is on the chart.
RISK ASSESSMENT:  Low.
FUNCTIONAL ASSESSMENT:  45/70.
RECREATIONAL DRUG USE: She denies.
SOCIAL HISTORY:  She is a former smoker and does drink alcohol.
 
According to the prescription monitoring system. the patient is filling 
appropriately and has not filled since early March.  According to our records, 
there should be one more prescription available.  I did take the liberty of 
calling [facility] and it is on file.  The patient's daughter did go to [facility]
last week and they denied any refills and that is why they made their 
appointment today.  Her morphine milliequivalent if she takes all of her 
medication, which evidently she is taking less is 22 MMEs per day.
 
PHYSICAL EXAMINATION:
GENERAL:  This is alert and orientated with some dementia and patient is angry 
today, 86-year-old female who is rating her pain score 10/10.
HEENT:  Normocephalic, atraumatic.  Extraocular eye muscles are intact.  Mucous 
membranes are moist.  She is wearing a mask and glasses.
EXTREMITIES:  No clubbing, no cyanosis, no edema.  Tenderness in her wrists 
bilaterally with positive Tinel test bilaterally.
MUSCULOSKELETAL:  She has tenderness in her lumbosacral region that radiates 
into her legs bilaterally, greater on the left than the right following the 
L5-S1 dermatomal distribution.  She does utilize a cane for ambulation and 
raises from the chair with the armrest assistance.
 
IMPRESSION:
1.  Symptomatic lumbar radiculopathy.
2.  Spinal stenosis of lumbar spine.
3.  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
4.  Lumbosacral spondylosis.
5.  Degeneration of lumbar spine.
6.  Possible carpal tunnel syndrome bilaterally.
7.  Depression.
8.  Dementia.
9.  Chronic opioid medications under written opioid agreement.
 
PLAN:
1.  We discussed treatment options with the patient today.  According to our 
PDMP from [place], it looks like the patient has only filled 2 prescriptions 
from our last visit in March where we provided her 3 months of medication and it
seems like they have been using these sparingly.  We did call the pharmacy and 
there is another prescription to be filled, so therefore, Dr. Adonis Multani will
allow 2 months of medication to be released one in 4 weeks and one in 8 weeks of
her oxycodone 5/325, #90.  The patient may utilize full 3 tablets during the 
summer months due to gardening and increased activity.
 
 
 
[facility]
[address]
[place]   [address]                     PAIN MANAGEMENT CONSULTATION  
_______________________________________________________________________________
 
Name:       DEEPIKAJARED CASSIDY              [facility] #[facility]                     [facility]      [id number]                       Account #:      [id number]  
Admission:  05/11/21    Attend Phys:    Mary Petit     
Discharge:              Date of Birth:  [birth year]  
                                                          Report #: [id number]
                                                                    [id number] 
_______________________________________________________________________________
2.  Patient denies any problems with constipation as long as she utilizes her 
MiraLax on an as-needed basis.
3.  Patient has had both her COVID vaccines and reports she is ready to go 
shopping.
4.  Patient will follow up in 3-4 months depending on usage of her opioid 
medications.
5.  The patient to utilize carpal tunnel braces at night to help decrease her 
pain that she experiences while sleeping.
 
Time spent with the patient in consultation, reviewing recent studies, clinical 
notes, physical reports, physical examination and correlation of findings and 
medical documentation to determine possible treatments 16 minutes.
 
Time spent in preparation for appointment, reviewing prescription monitoring 
report, reviewing previous records and treatment options and reviewing current 
medications 5 minutes.
 
Time spent in preparing and sending electronic prescriptions with collaborating 
physician, Dr. Adonis Multani and documentation of visit and plan of treatment 5 
minutes.
 
Total time spent 26 minutes.
 
NITIN Celestin/HALEY/DEEDEE
 
D: 05/11/2021 01:53 PM
T: 05/12/2021 12:38 AM
 
 
 
 
 
 
 
 
 
 
 
 
 
 
 
 
  <ELECTRONICALLY SIGNED>
   By: Mary Petit             
  05/12/21     0718
D: 05/11/21 1253                           _____________________________________
T: 05/11/21 2338                           Mary ruggiero

## 2021-07-19 ENCOUNTER — HOSPITAL ENCOUNTER (OUTPATIENT)
Dept: HOSPITAL 35 - SJCVC | Age: 86
End: 2021-07-19
Attending: INTERNAL MEDICINE
Payer: COMMERCIAL

## 2021-07-19 DIAGNOSIS — I45.4: ICD-10-CM

## 2021-07-19 DIAGNOSIS — Z87.891: ICD-10-CM

## 2021-07-19 DIAGNOSIS — J44.9: ICD-10-CM

## 2021-07-19 DIAGNOSIS — I25.10: ICD-10-CM

## 2021-07-19 DIAGNOSIS — I10: ICD-10-CM

## 2021-07-19 DIAGNOSIS — I73.9: ICD-10-CM

## 2021-07-19 DIAGNOSIS — Z72.89: ICD-10-CM

## 2021-07-19 DIAGNOSIS — R94.31: Primary | ICD-10-CM

## 2021-07-19 DIAGNOSIS — E78.00: ICD-10-CM

## 2021-07-19 DIAGNOSIS — Z79.899: ICD-10-CM

## 2021-07-19 DIAGNOSIS — R60.9: ICD-10-CM

## 2021-08-03 ENCOUNTER — HOSPITAL ENCOUNTER (OUTPATIENT)
Dept: HOSPITAL 35 - SJCVCIMAG | Age: 86
End: 2021-08-03
Attending: RADIOLOGY
Payer: COMMERCIAL

## 2021-08-03 DIAGNOSIS — I77.9: ICD-10-CM

## 2021-08-03 DIAGNOSIS — Z72.89: ICD-10-CM

## 2021-08-03 DIAGNOSIS — I25.10: ICD-10-CM

## 2021-08-03 DIAGNOSIS — I73.9: ICD-10-CM

## 2021-08-03 DIAGNOSIS — I65.23: ICD-10-CM

## 2021-08-03 DIAGNOSIS — Z95.820: ICD-10-CM

## 2021-08-03 DIAGNOSIS — I10: ICD-10-CM

## 2021-08-03 DIAGNOSIS — I70.203: Primary | ICD-10-CM

## 2021-08-03 DIAGNOSIS — Z79.899: ICD-10-CM

## 2021-08-03 DIAGNOSIS — Z79.891: ICD-10-CM

## 2021-08-03 DIAGNOSIS — Z87.891: ICD-10-CM

## 2021-08-03 DIAGNOSIS — E78.00: ICD-10-CM

## 2021-08-03 DIAGNOSIS — J44.9: ICD-10-CM

## 2021-09-14 ENCOUNTER — HOSPITAL ENCOUNTER (OUTPATIENT)
Dept: HOSPITAL 35 - PAIN | Age: 86
End: 2021-09-14
Attending: CLINICAL NURSE SPECIALIST
Payer: COMMERCIAL

## 2021-09-14 VITALS — BODY MASS INDEX: 25.32 KG/M2 | HEIGHT: 60 IN | WEIGHT: 129 LBS

## 2021-09-14 VITALS — SYSTOLIC BLOOD PRESSURE: 156 MMHG | DIASTOLIC BLOOD PRESSURE: 66 MMHG

## 2021-09-14 DIAGNOSIS — G56.03: ICD-10-CM

## 2021-09-14 DIAGNOSIS — Z79.01: ICD-10-CM

## 2021-09-14 DIAGNOSIS — F11.90: ICD-10-CM

## 2021-09-14 DIAGNOSIS — F03.90: ICD-10-CM

## 2021-09-14 DIAGNOSIS — M51.16: ICD-10-CM

## 2021-09-14 DIAGNOSIS — M48.061: Primary | ICD-10-CM

## 2021-09-14 DIAGNOSIS — M47.27: ICD-10-CM

## 2021-09-14 NOTE — NUR
Pain Clinic Assessment:
 
1. History of Osteoarthritis:
FINGERS
BACK
   History of Rheumatoid Arthritis:
Not Applicable
 
2. Height: 5 ft. 0 in. 152.4 cm.
   Weight: 129.0 lb.  oz. 58.514 kg.
   Patient's BMI: 25.2
 
3. Vital Signs:
   BP: 156/66 Pulse: 64 Resp: 16
   Temp:  02 Sat: 94 ECG Mon:
 
4. Pain Intensity: 8
 
5. Fall Risk:
   Dizziness: N  Needs help standing or walking: N
   Fallen in the last 3 months: N
   Fall risk comments:
USES CANE FULL TIME
SLIPPED ON SHOE THAT WAS IN MIDDLE OF FLOOR THAT DIDN'T
SEE AND HIT DOG KENNEL
 
6. Patient on Blood Thinner: Clopidogrel Bisulf(Plavix
 
7. History of Hypertension: Y
 
8. Opioid Therapy greater than 6 weeks: Y
   Opiate Contract Signed: 09/02/16
 
9. Risk Assessment Tool Provided: 1-LOW RISK
 
10. Functional Assessment Tool: 45/70
 
11. Recreational Drug Use: Never Drug Type:
    Tobacco Use: Former Smoker Tobacco Type:
       Amount or Packs/day:  How Many Years:
    Alcohol Use: Yes  Frequency:  Quant:

## 2021-09-15 NOTE — HPC
[facility]
[address]
[place]   [address]                     PAIN MANAGEMENT CONSULTATION  
_______________________________________________________________________________
 
Name:       DEEPIKACRISTELAMILCAR CASSIDY              Room #:                     REG [facility]#:      [id number]                       Account #:      [id number]  
Admission:  09/14/21    Attend Phys:    Mary Petit     
Discharge:              Date of Birth:  [birth year]  
                                                          Report #: [id number]
                                                                    [id number] 
_______________________________________________________________________________
THIS REPORT FOR:  
 
cc:  Lamar Dominguez MD, Melanie MD Hocker,Mary NICHOLSON                                            ~
 
cc:     Adonis Multani DO
DATE OF SERVICE: 09/14/2021
 
CHIEF COMPLAINT:  Low back pain, lumbar radiculopathy and bilateral hand pain.
 
HISTORY OF PRESENT ILLNESS:  This is a very pleasant 86-year-old female who is 
here with her daughter for renewal of her opioid medications.  Today, the 
patient is mostly complaining of bilateral hand pain.  She states they are 
burning, numbness especially in her fingertips, this is related to carpal tunnel
issues.  She also is having ongoing low back pain that does radiate into her 
legs occasionally.  Her pain is an 8/10 today per her report.  It is worse with 
use of her hands as well as walking and standing.  She does utilize a cane today
and has a walker that she utilizes at times.  The daughter reports that 
occasionally the patient will go without her midday oxycodone when she is having
less painful days, but typically, she does take three tablets of her oxycodone a
day and denies any daytime somnolence or constipation issues from her 
medications.
 
ALLERGIES:  No known drug allergies.
 
CURRENT LIST OF MEDICATIONS:  Oxycodone, levothyroxine, ropinirole, Trelegy, 
amlodipine, MiraLax, vitamin D, Aricept, Colace, Mucinex, Effexor, Protonix, 
Plavix, vitamin C, vitamin B complex, Zofran, and Zyrtec.
 
PQRS:
1.  She has osteoarthritic changes in her hands and back.  Denies any rheumatoid
arthritis.  Height is 5 feet, weight is 129.  BMI is 25.
2.  Vital signs 156/66, pulse is 64, respirations 16, oxygen sat is 93%.
3.  Pain score is 8/10.
4.  Denies dizziness.  Does need assistance with ambulation, uses a cane at all 
times and has fallen in the last 3 months, but has not injured herself.  The 
patient remains on Plavix as well as medications for hypertension.  Her opioid 
therapy is greater than 6 weeks; therefore, an opioid signed contract is on the 
chart.
5.  Risk assessment is low.  Functional assessment is 45/70.
6.  Recreational drug use, she denies.  She is a former smoker and occasionally 
drinks alcohol.
 
According to the prescription monitoring system, the patient is past due to fill
her medications.  The daughter reports taking less medications on some days; 
 
 
 
[address]   [address]                     PAIN MANAGEMENT CONSULTATION  
_______________________________________________________________________________
 
Name:       JARED POE              Room #:                     REG [facility]#:      [id number]                       Account #:      [id number]  
Admission:  09/14/21    Attend Phys:    Mary Petit     
Discharge:              Date of Birth:  [birth year]  
                                                          Report #: [id number]
                                                                    [id number] 
_______________________________________________________________________________
therefore, she has been able to last slightly longer than her 30 days.  Her 
morphine milliequivalent according to the CDC guidelines is less than 22 MMEs 
per day.  There is a drug screen on the chart and we will collect another one at
her next appointment.
 
PHYSICAL EXAMINATION:
GENERAL:  This is alert, very pleasant 86-year-old female, who has some 
dementia, but is fairly clear today in her answers.  Rating her pain score today
at 8/10.
HEENT:  Normocephalic, atraumatic.  Extraocular eye muscles are intact.  She is 
wearing the mask.
EXTREMITIES:  No clubbing, no cyanosis, no edema.  She has tenderness in her 
wrists bilaterally with positive Tinel test bilaterally, greater pain on the 
right than the left.
MUSCULOSKELETAL:  Tenderness in the lumbosacral region that radiates into her 
legs following the L5-S1 dermatomal distribution.  She uses armrest to raise 
from the seated to standing position, has an antalgic gait, uses a cane at all 
times.
 
IMPRESSION:
1.  Symptomatic lumbar radiculopathy.
2.  Spinal stenosis of lumbar spine.
3.  Carpal tunnel syndrome bilaterally.
4.  On anticoagulation therapy.
5.  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
6.  Lumbosacral spondylosis.
7.  Dementia, on Aricept.
8.  Chronic opioid medications under written agreement.
 
PLAN:
1.  We discussed treatment options with the patient today.  I encouraged the 
patient to continue wearing her wrist brace, especially at night to help prevent
discomfort.  We had tried anti-seizure medications to help with the numbness in 
her hands in the past, but due to her fall history and dementia, those 
medications had many side effects for her.  I did encourage the daughter to 
speak with a neurosurgeon to see if they would be able to perform surgery under 
a block instead of general anesthesia.  Then, she would need to be off her 
Plavix for the procedures.
2.  We will continue the patient on her oxycodone 5/325, #90.  This will be sent
electronically by Dr. Multani for today for an 8-week release.
3.  We will send her ropinirole 0.5 mg 2 tablets at noon and 3 tablets at night 
#45 with 3 refills to [facility].  This is her mail of pharmacy.  The 
patient does find this beneficial in helping with her restless legs.
4.  The patient will return in 3-4 months depending on usage of her medications.
 
Time spent with the patient in consultation, reviewing recent clinical notes, 
 
 
 
[facility]
[address] [place]   [address]                     PAIN MANAGEMENT CONSULTATION  
_______________________________________________________________________________
 
Name:       JARED POE              Room #:                     REG [facility] 
ANGEL.#:      [id number]                       Account #:      [id number]  
Admission:  09/14/21    Attend Phys:    Mary Petit     
Discharge:              Date of Birth:  [birth year]  
                                                          Report #: [id number]
                                                                    [id number] 
_______________________________________________________________________________
physical reports, physical examination and correlation of findings, medical 
documentation to determine possible treatment options 17 minutes.  Time spent 
for preparation for appointment, reviewing prescription, monitoring reports, 
reviewing previous records and treatment options, reviewing current medications 
5 minutes.  Time spent preparing and sending electronic prescriptions with 
collaborating physician, Dr. Adonis Multani, and documentation of visit and plan 
of treatment 5 minutes.
 
Total time spent 27 minutes.
 
 
 
 
 
 
 
 
 
 
 
 
 
 
 
 
 
 
 
 
 
 
 
 
 
 
 
 
 
 
 
 
 
 
 
  <ELECTRONICALLY SIGNED>
   By: Mary Petit             
  09/15/21     0809
D: 09/14/21 1105                           _____________________________________
T: 09/14/21 2003                           Mary Petit               /nt

## 2021-12-22 ENCOUNTER — HOSPITAL ENCOUNTER (OUTPATIENT)
Dept: HOSPITAL 35 - PAIN | Age: 86
End: 2021-12-22
Attending: ANESTHESIOLOGY
Payer: COMMERCIAL

## 2021-12-22 VITALS — WEIGHT: 125.4 LBS | BODY MASS INDEX: 24.62 KG/M2 | HEIGHT: 60 IN

## 2021-12-22 VITALS — SYSTOLIC BLOOD PRESSURE: 184 MMHG | DIASTOLIC BLOOD PRESSURE: 77 MMHG

## 2021-12-22 DIAGNOSIS — G89.29: ICD-10-CM

## 2021-12-22 DIAGNOSIS — F03.90: ICD-10-CM

## 2021-12-22 DIAGNOSIS — G56.00: ICD-10-CM

## 2021-12-22 DIAGNOSIS — M79.642: ICD-10-CM

## 2021-12-22 DIAGNOSIS — M79.669: ICD-10-CM

## 2021-12-22 DIAGNOSIS — M48.061: ICD-10-CM

## 2021-12-22 DIAGNOSIS — M54.50: ICD-10-CM

## 2021-12-22 DIAGNOSIS — M47.27: Primary | ICD-10-CM

## 2021-12-22 DIAGNOSIS — M79.641: ICD-10-CM

## 2021-12-22 DIAGNOSIS — Z79.899: ICD-10-CM
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
## 2021-12-22 NOTE — HPC
[facility]
[address]
[place]   [address]                     PAIN MANAGEMENT CONSULTATION  
_______________________________________________________________________________
 
Name:       JARED POE              Room #:                     REG [facility].#:      [id number]                       Account #:      [id number]  
Admission:  12/22/21    Attend Phys:    Adonis Multani DO  
Discharge:              Date of Birth:  [birth year]  
                                                          Report #: [id number]
                                                                    [id number] 
_______________________________________________________________________________
THIS REPORT FOR:  
 
cc:  Lamar Dominguez MD,Adonis Moreira MD, DO                                          ~
 
cc:     Lamar Dominguez MD
DATE OF SERVICE: 12/22/2021
 
CHIEF COMPLAINT:  Low back pain, bilateral lower extremity pain and bilateral 
hand pain.
 
HISTORY OF PRESENT ILLNESS:  As you know, the patient is an 86-year-old female, 
returning today in followup visit for medication management to address her 
chronic lumbar radiculopathy.  She is also complaining of bilateral hand pain.  
She has been diagnosed with moderate-to-severe carpal tunnel syndrome, but has 
yet to seek evaluation for surgical decompression.  She returns today requesting
a refill of her Percocet 5/325 for which she takes 3 times a day for back pain 
and lower extremity pain secondary to the severe central canal stenosis of 
lumbar spine.  The patient is denying side effects to the medication including 
sleepiness, disorientation, confusion, mental slowing or constipation.  She 
returns today requesting refill for the next 3 months.  The patient has suffered
no new injury, no new trauma that may have led to continuation of her lumbar 
radicular symptoms.  As you are aware, her symptoms are related to 
multifactorial central canal stenosis.  She does complain of bilateral hand pain
and has had the diagnosis of moderate-to-severe carpal tunnel syndrome 
bilaterally.  She has had no changes in medication management since her last 
visit.
 
ALLERGIES:  No known drug allergies.
 
CURRENT MEDICATIONS:  Oxycodone, levothyroxine, ropinirole, Trelegy, amlodipine,
MiraLAX, vitamin D, Aricept, Colace, Mucinex, Effexor, Protinix, Plavix, vitamin
C, vitamin B complex, Zofran and Zyrtec.
 
SOCIAL HISTORY:  The patient is a reformed smoker.  She denies IV or illicit 
drug use.  Denies any chronic alcohol use.  She is coming by her daughter, who 
is present with her today.
 
PQRS:  She is a fall risk, but has not had a fall in last 3 months.  She is on 
blood thinners in the form of Plavix.  She is treated for hypertension.  She is 
on chronic opioids, has a low opioid addiction potential based on our assessment
tool.  Pain impact is 45/70, severe interference of daily activities secondary 
to pain.
 
PHYSICAL EXAMINATION:
 
 
 
[facility]
[address] [place]   [address]                     PAIN MANAGEMENT CONSULTATION  
_______________________________________________________________________________
 
Name:       JARED POE              [facility]                     [facility]      [id number]                       Account #:      [id number]  
Admission:  12/22/21    Attend Phys:    Adonis Multani DO  
Discharge:              Date of Birth:  [birth year]  
                                                          Report #: [id number]
                                                                    [id number] 
_______________________________________________________________________________
VITAL SIGNS:  Blood pressure 184/77, pulse 67, respiratory rate 20 and 
unlabored.  The patient is 99% on room air.  Height 5 feet tall, weight is 125.4
pounds, BMI calculated 24.5.
GENERAL:  Well-developed, well-nourished, well-hydrated 86-year-old male.  She 
appears her stated age.  Pain is rated today of 8/10.
HEENT:  Normocephalic, atraumatic.  Pupils are round.  She is wearing a mask in 
compliance with COVID-19 regulations.
EXTREMITIES:  Show no clubbing, no cyanosis.  No appreciable edema.
MUSCULOSKELETAL:  Lower extremity strength is symmetrical but deconditioning 
noted bilaterally.  She does use a cane for ambulation.  Gait is antalgic.  
Seated straight leg raising is positive.  Supine straight leg raising is 
positive.  Tinel's sign positive, Phalen's sign positive bilaterally.
 
ASSESSMENT:
1.  Symptomatic lumbar radiculopathy.
2.  Spinal stenosis, lumbar spine.
3.  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
4.  Lumbosacral spondylosis with radiculopathy.
5.  Moderate-to-severe carpal tunnel syndrome.
6.  Dementia.
7.  Chronic intractable pain.
8.  Complicated mediation management utilizing scheduled medications.
 
PLAN:
1.  The patient has returned today in followup visit requesting refill on her 
medications.  She is reporting no side effects to the medications including 
sleepiness, disorientation, confusion, mental slowing or constipation.  She 
states that without the medication she would not be able to participate in her 
daily activities.  She states with the medication she is able to participate in 
daily activities without significant pain in interference.  She has requested 
refill of medication to be provided today.
2.  We reviewed the fact that opiate medications are being used to provide 
analgesia adequate to support activities of daily living, not attempting to 
achieve a specific pain score on the 0-10 Visual Analog Scale.  The current 
opiate medications are providing sufficient analgesia to allow the patient to 
participate in activities of daily living.  The patient is not exhibiting any 
aberrant behavior suggestive of drug diversion.  The patient is not having any 
adverse reactions to medications.  The patient is not suffering from daytime 
somnolence or mental acuity changes.  The patient is managing opiate-induced 
constipation with appropriate over-the-counter agents and dietary 
considerations.  The patient was counseled on concern for caution with operating
a motor vehicle while using opiate medications.
 
A physical exam was performed and the patient's functional status was evaluated.
 All patients with back pain were advised against the bed rest greater than 4 
days and were advised to return to normal activities.  Pain score assessment was
 
 
 
[facility]
[address]
[place]   [address]                     PAIN MANAGEMENT CONSULTATION  
_______________________________________________________________________________
 
Name:       JARED POE              [id number]                       Account #:      [id number]  
Admission:  12/22/21    Attend Phys:    Adonis Multani DO  
Discharge:              Date of Birth:  [birth year]  
                                                          Report #: [id number]
                                                                    [id number] 
_______________________________________________________________________________
noted and the treatment plan was reviewed with the patient.  All current 
medications, both prescribed and OTC were reviewed and reconciled on the 
electronic medical record.  Tobacco screening was accomplished and smoking 
cessation was advised when indicated.  BMI was noted and diet/exercise 
modification was recommended for all patients following outside normal 
parameters.
 
I reviewed with the patient today their responsibilities to safeguard 
prescription medications, reviewed their responsibility to utilize medications 
only as prescribed by the physician.  They are to seek and receive pain 
medications only from 1 physician group ( [facility]).  They are to use 1
pharmacy and keep the clinic informed if they change pharmacies.  Their 
responsibilities include making followup visits in a timely fashion and to avoid
abrupt discontinuation of medication usage.  Their responsibilities further 
include bringing their medications (bottles from the pharmacy with residual 
pills) to the visit for possible confirmation of pill counts and the patient 
understands it is their responsibility to submit to random drug screens to 
ensure both that the medications prescribed are present, and that no other 
controlled substances are present.  All prescriptions provided today were 
generated electronically.
3.  The patient was provided a prescription for Percocet 5/325 one tablet p.o. 
q. 8 hours p.r.n. for pain.  Given the patient #90 tablets.  Those were to 
release today, 4 weeks from today, 8 weeks from today, 3 months' worth of 
medications.  [place] [facility] [place]
4.  The patient needs to follow up with her PCP in regards to referrals for hand
surgeons discuss carpal tunnel release procedures.  I do feel that given her 
increasing disability from the carpal tunnel syndrome that release of the flexor
retinaculum would be appropriate.  She will be following up with her PCP in 
regards to referrals to hand surgeons such as Dr. Neeta Arnold.
5.  We will see the patient back in followup visit in 3 months for medication 
management.
 
 
 
 
 
 
 
 
 
 
 
 
 
                         
   By:                               
                   
D: 12/28/21 1134                           _____________________________________
T: 12/29/21 0000                           Adonis Multani DO            /nt

## 2022-02-23 ENCOUNTER — HOSPITAL ENCOUNTER (OUTPATIENT)
Dept: HOSPITAL 35 - SJCVCIMAG | Age: 87
End: 2022-02-23
Attending: INTERNAL MEDICINE
Payer: COMMERCIAL

## 2022-02-23 DIAGNOSIS — I70.203: Primary | ICD-10-CM

## 2022-02-23 DIAGNOSIS — I65.29: ICD-10-CM

## 2022-02-23 DIAGNOSIS — I10: ICD-10-CM

## 2022-02-23 DIAGNOSIS — I25.10: ICD-10-CM

## 2022-02-23 DIAGNOSIS — R94.31: ICD-10-CM

## 2022-02-23 DIAGNOSIS — I77.9: ICD-10-CM

## 2022-02-23 DIAGNOSIS — Z98.890: ICD-10-CM

## 2022-02-23 DIAGNOSIS — E78.00: ICD-10-CM

## 2022-02-23 DIAGNOSIS — J44.9: ICD-10-CM

## 2022-02-23 DIAGNOSIS — Z87.891: ICD-10-CM

## 2022-02-23 DIAGNOSIS — Z79.899: ICD-10-CM
